# Patient Record
Sex: MALE | Race: WHITE | Employment: UNEMPLOYED | URBAN - METROPOLITAN AREA
[De-identification: names, ages, dates, MRNs, and addresses within clinical notes are randomized per-mention and may not be internally consistent; named-entity substitution may affect disease eponyms.]

---

## 2017-01-03 ENCOUNTER — HOSPITAL ENCOUNTER (INPATIENT)
Age: 62
LOS: 2 days | Discharge: HOME | DRG: 247 | End: 2017-01-05
Attending: FAMILY MEDICINE | Admitting: INTERNAL MEDICINE
Payer: COMMERCIAL

## 2017-01-03 PROBLEM — I24.9 ACS (ACUTE CORONARY SYNDROME) (HCC): Status: ACTIVE | Noted: 2017-01-03

## 2017-01-03 LAB
ALBUMIN SERPL BCP-MCNC: 3.6 G/DL (ref 3.4–5)
ALBUMIN/GLOB SERPL: 1.4 {RATIO} (ref 0.8–1.7)
ALP SERPL-CCNC: 61 U/L (ref 45–117)
ALT SERPL-CCNC: 31 U/L (ref 16–61)
ANION GAP BLD CALC-SCNC: 9 MMOL/L (ref 3–18)
APTT PPP: 36.9 SEC (ref 23–36.4)
AST SERPL W P-5'-P-CCNC: 37 U/L (ref 15–37)
BASOPHILS # BLD AUTO: 0 K/UL (ref 0–0.06)
BASOPHILS # BLD: 0 % (ref 0–2)
BILIRUB DIRECT SERPL-MCNC: 0.2 MG/DL (ref 0–0.2)
BILIRUB SERPL-MCNC: 0.8 MG/DL (ref 0.2–1)
BUN SERPL-MCNC: 17 MG/DL (ref 7–18)
BUN/CREAT SERPL: 21 (ref 12–20)
CALCIUM SERPL-MCNC: 8.1 MG/DL (ref 8.5–10.1)
CHLORIDE SERPL-SCNC: 104 MMOL/L (ref 100–108)
CK MB CFR SERPL CALC: 7.1 % (ref 0–4)
CK MB SERPL-MCNC: 10.5 NG/ML (ref 0.5–3.6)
CK SERPL-CCNC: 147 U/L (ref 39–308)
CO2 SERPL-SCNC: 25 MMOL/L (ref 21–32)
CREAT SERPL-MCNC: 0.8 MG/DL (ref 0.6–1.3)
DIFFERENTIAL METHOD BLD: ABNORMAL
EOSINOPHIL # BLD: 0 K/UL (ref 0–0.4)
EOSINOPHIL NFR BLD: 0 % (ref 0–5)
ERYTHROCYTE [DISTWIDTH] IN BLOOD BY AUTOMATED COUNT: 13.4 % (ref 11.6–14.5)
GLOBULIN SER CALC-MCNC: 2.5 G/DL (ref 2–4)
GLUCOSE SERPL-MCNC: 111 MG/DL (ref 74–99)
HCT VFR BLD AUTO: 37.9 % (ref 36–48)
HGB BLD-MCNC: 12.9 G/DL (ref 13–16)
INR PPP: 1.1 (ref 0.8–1.2)
LYMPHOCYTES # BLD AUTO: 17 % (ref 21–52)
LYMPHOCYTES # BLD: 2 K/UL (ref 0.9–3.6)
MCH RBC QN AUTO: 31.1 PG (ref 24–34)
MCHC RBC AUTO-ENTMCNC: 34 G/DL (ref 31–37)
MCV RBC AUTO: 91.3 FL (ref 74–97)
MONOCYTES # BLD: 1 K/UL (ref 0.05–1.2)
MONOCYTES NFR BLD AUTO: 8 % (ref 3–10)
NEUTS SEG # BLD: 8.8 K/UL (ref 1.8–8)
NEUTS SEG NFR BLD AUTO: 75 % (ref 40–73)
PLATELET # BLD AUTO: 126 K/UL (ref 135–420)
PMV BLD AUTO: 10.8 FL (ref 9.2–11.8)
POTASSIUM SERPL-SCNC: 3.4 MMOL/L (ref 3.5–5.5)
PROT SERPL-MCNC: 6.1 G/DL (ref 6.4–8.2)
PROTHROMBIN TIME: 13.7 SEC (ref 11.5–15.2)
RBC # BLD AUTO: 4.15 M/UL (ref 4.7–5.5)
SODIUM SERPL-SCNC: 138 MMOL/L (ref 136–145)
TROPONIN I SERPL-MCNC: 5.2 NG/ML (ref 0–0.04)
TSH SERPL DL<=0.05 MIU/L-ACNC: 2.66 UIU/ML (ref 0.36–3.74)
WBC # BLD AUTO: 11.8 K/UL (ref 4.6–13.2)

## 2017-01-03 PROCEDURE — 74011250637 HC RX REV CODE- 250/637: Performed by: INTERNAL MEDICINE

## 2017-01-03 PROCEDURE — 85610 PROTHROMBIN TIME: CPT | Performed by: INTERNAL MEDICINE

## 2017-01-03 PROCEDURE — 80048 BASIC METABOLIC PNL TOTAL CA: CPT | Performed by: INTERNAL MEDICINE

## 2017-01-03 PROCEDURE — 74011000250 HC RX REV CODE- 250: Performed by: INTERNAL MEDICINE

## 2017-01-03 PROCEDURE — 84443 ASSAY THYROID STIM HORMONE: CPT | Performed by: INTERNAL MEDICINE

## 2017-01-03 PROCEDURE — 85025 COMPLETE CBC W/AUTO DIFF WBC: CPT | Performed by: INTERNAL MEDICINE

## 2017-01-03 PROCEDURE — 74011250636 HC RX REV CODE- 250/636: Performed by: INTERNAL MEDICINE

## 2017-01-03 PROCEDURE — 93005 ELECTROCARDIOGRAM TRACING: CPT

## 2017-01-03 PROCEDURE — 85730 THROMBOPLASTIN TIME PARTIAL: CPT | Performed by: INTERNAL MEDICINE

## 2017-01-03 PROCEDURE — 65620000000 HC RM CCU GENERAL

## 2017-01-03 PROCEDURE — 36415 COLL VENOUS BLD VENIPUNCTURE: CPT | Performed by: INTERNAL MEDICINE

## 2017-01-03 PROCEDURE — 80076 HEPATIC FUNCTION PANEL: CPT | Performed by: INTERNAL MEDICINE

## 2017-01-03 PROCEDURE — 82550 ASSAY OF CK (CPK): CPT | Performed by: INTERNAL MEDICINE

## 2017-01-03 RX ORDER — ASPIRIN 325 MG
325 TABLET, DELAYED RELEASE (ENTERIC COATED) ORAL DAILY
Status: DISCONTINUED | OUTPATIENT
Start: 2017-01-04 | End: 2017-01-05

## 2017-01-03 RX ORDER — METOPROLOL TARTRATE 25 MG/1
12.5 TABLET, FILM COATED ORAL ONCE
Status: COMPLETED | OUTPATIENT
Start: 2017-01-03 | End: 2017-01-03

## 2017-01-03 RX ORDER — SODIUM CHLORIDE 0.9 % (FLUSH) 0.9 %
5-10 SYRINGE (ML) INJECTION EVERY 8 HOURS
Status: DISCONTINUED | OUTPATIENT
Start: 2017-01-03 | End: 2017-01-05 | Stop reason: HOSPADM

## 2017-01-03 RX ORDER — ATORVASTATIN CALCIUM 40 MG/1
40 TABLET, FILM COATED ORAL
Status: DISCONTINUED | OUTPATIENT
Start: 2017-01-03 | End: 2017-01-05 | Stop reason: HOSPADM

## 2017-01-03 RX ORDER — DOCUSATE SODIUM 100 MG/1
100 CAPSULE, LIQUID FILLED ORAL 2 TIMES DAILY
Status: DISCONTINUED | OUTPATIENT
Start: 2017-01-03 | End: 2017-01-05 | Stop reason: HOSPADM

## 2017-01-03 RX ORDER — ADHESIVE BANDAGE
30 BANDAGE TOPICAL DAILY PRN
Status: DISCONTINUED | OUTPATIENT
Start: 2017-01-03 | End: 2017-01-05 | Stop reason: HOSPADM

## 2017-01-03 RX ORDER — EPTIFIBATIDE 0.75 MG/ML
2 INJECTION, SOLUTION INTRAVENOUS CONTINUOUS
Status: DISCONTINUED | OUTPATIENT
Start: 2017-01-03 | End: 2017-01-04

## 2017-01-03 RX ORDER — GUAIFENESIN 100 MG/5ML
325 LIQUID (ML) ORAL
Status: DISPENSED | OUTPATIENT
Start: 2017-01-03 | End: 2017-01-04

## 2017-01-03 RX ORDER — METOPROLOL TARTRATE 25 MG/1
12.5 TABLET, FILM COATED ORAL 2 TIMES DAILY
Status: DISCONTINUED | OUTPATIENT
Start: 2017-01-03 | End: 2017-01-04

## 2017-01-03 RX ORDER — CLOPIDOGREL BISULFATE 75 MG/1
75 TABLET ORAL DAILY
Status: DISCONTINUED | OUTPATIENT
Start: 2017-01-04 | End: 2017-01-05 | Stop reason: HOSPADM

## 2017-01-03 RX ORDER — HEPARIN SODIUM 10000 [USP'U]/100ML
12-25 INJECTION, SOLUTION INTRAVENOUS
Status: DISCONTINUED | OUTPATIENT
Start: 2017-01-03 | End: 2017-01-04

## 2017-01-03 RX ORDER — NITROGLYCERIN 40 MG/100ML
5-20 INJECTION INTRAVENOUS
Status: DISCONTINUED | OUTPATIENT
Start: 2017-01-03 | End: 2017-01-05 | Stop reason: HOSPADM

## 2017-01-03 RX ORDER — SODIUM CHLORIDE 0.9 % (FLUSH) 0.9 %
5-10 SYRINGE (ML) INJECTION AS NEEDED
Status: DISCONTINUED | OUTPATIENT
Start: 2017-01-03 | End: 2017-01-05 | Stop reason: HOSPADM

## 2017-01-03 RX ADMIN — METOPROLOL TARTRATE 12.5 MG: 25 TABLET ORAL at 20:43

## 2017-01-03 RX ADMIN — METOPROLOL TARTRATE 12.5 MG: 25 TABLET ORAL at 22:26

## 2017-01-03 RX ADMIN — Medication 10 ML: at 20:49

## 2017-01-03 RX ADMIN — EPTIFIBATIDE 2 MCG/KG/MIN: 75 INJECTION INTRAVENOUS at 21:00

## 2017-01-03 RX ADMIN — ATORVASTATIN CALCIUM 40 MG: 40 TABLET, FILM COATED ORAL at 22:26

## 2017-01-03 RX ADMIN — DOCUSATE SODIUM 100 MG: 100 CAPSULE, LIQUID FILLED ORAL at 20:43

## 2017-01-03 RX ADMIN — NITROGLYCERIN 5 MCG/MIN: 40 INJECTION INTRAVENOUS at 22:38

## 2017-01-03 RX ADMIN — HEPARIN SODIUM AND DEXTROSE 19.23 UNITS/KG/HR: 10000; 5 INJECTION INTRAVENOUS at 21:00

## 2017-01-03 NOTE — ROUTINE PROCESS
TRANSFER - IN REPORT:    Verbal report received from 3 [place]hill RN(name[facility]  Katerina(unit) for urgent transfer      Report consisted of patients Situation, Background, Assessment and   Recommendations(SBAR). Information from the following report(s) SBAR, Kardex, ED Summary and MAR was reviewed with the receiving nurse. Opportunity for questions and clarification was provided. Assessment completed upon patients arrival to unit and care assumed.

## 2017-01-03 NOTE — IP AVS SNAPSHOT
[address] [address] 
[phone] Patient: Ирина Nelson MRN: [id number] KWY:[birth year] You are allergic to the following Allergen Reactions Crestor (Rosuvastatin) Unknown (comments) Intolerant; abdominal pain Iodine Hives IV dye Ivp Dye (Fd And C Blue No.1) Swelling Toradol (Ketorolac Tromethamine) Rash Other reaction(s): Hives Profuse sweating Tricor (Fenofibrate Micronized) Other (comments) Abdominal pain Zetia (Ezetimibe) Unknown (comments) Hot flashes Recent Documentation Height Weight BMI Smoking Status 1.803 m 105 kg 32.3 kg/m2 Former Smoker Emergency Contacts Name Discharge Info Relation Home Work Mobile Cyndi Coreas DECLINED CAREGIVER [4] Spouse [3] [phone] About your hospitalization You were admitted on:  January 3, 2017 You last received care in the:  [facility] 2 CV STEPDOWN You were discharged on:  January 5, 2017 Unit phone number:  [phone] Why you were hospitalized Your primary diagnosis was:  Not on File Your diagnoses also included:  Acs (Acute Coronary Syndrome) (Hcc) Providers Seen During Your Hospitalizations Provider Role Specialty Primary office phone Yusef Starkey MD Attending Provider [facility] [phone] Moises Najera MD Attending Provider Internal Medicine [phone] Your Primary Care Physician (PCP) Primary Care Physician Office Phone Office Fax Loganwil Chloe [phone] Follow-up Information Follow up With Details Comments Contact Info Gerardo Doshi MD On 1/10/2017 appointment time @2:30am please arrive 15 mintues early and bring in discharge information  [address] [place] [address] 
[phone] Brandon He NP Schedule an appointment as soon as possible for a visit in 3 weeks Will call patient with appointment date and time on Friday 1/6/16 [facility] [address] Cardiovascular [address] [facility] [address] 
[phone] Current Discharge Medication List  
  
START taking these medications Dose & Instructions Dispensing Information Comments Morning Noon Evening Bedtime  
 clopidogrel 75 mg Tab Commonly known as:  PLAVIX Your next dose is: Today, Tomorrow Other:  _________ Dose:  75 mg Take 1 Tab by mouth daily. Quantity:  30 Tab Refills:  1 CONTINUE these medications which have CHANGED Dose & Instructions Dispensing Information Comments Morning Noon Evening Bedtime * atorvastatin 40 mg tablet Commonly known as:  LIPITOR What changed:  Another medication with the same name was added. Make sure you understand how and when to take each. Your next dose is: Today, Tomorrow Other:  _________ TAKE 1 TABLET BY MOUTH DAILY Quantity:  90 tablet Refills:  3  
     
   
   
   
  
 * atorvastatin 40 mg tablet Commonly known as:  LIPITOR What changed: You were already taking a medication with the same name, and this prescription was added. Make sure you understand how and when to take each. Your next dose is: Today, Tomorrow Other:  _________ Dose:  40 mg Take 1 Tab by mouth nightly. Quantity:  30 Tab Refills:  1  
     
   
   
   
  
 metoprolol tartrate 25 mg tablet Commonly known as:  LOPRESSOR What changed:  See the new instructions. Your next dose is: Today, Tomorrow Other:  _________ Dose:  25 mg Take 1 Tab by mouth two (2) times a day. Quantity:  60 Tab Refills:  1  
     
   
   
   
  
 * Notice:   This list has 2 medication(s) that are the same as other medications prescribed for you. Read the directions carefully, and ask your doctor or other care provider to review them with you. CONTINUE these medications which have NOT CHANGED Dose & Instructions Dispensing Information Comments Morning Noon Evening Bedtime  
 aspirin delayed-release 81 mg tablet Your next dose is: Today, Tomorrow Other:  _________ Dose:  81 mg Take 81 mg by mouth. Refills:  0 BENEFIBER (GUAR GUM) PO Your next dose is: Today, Tomorrow Other:  _________ Take  by mouth. Refills:  0  
     
   
   
   
  
 cpap machine kit Your next dose is: Today, Tomorrow Other:  _________ Take  by inhalation nightly. Refills:  0 DULoxetine 60 mg capsule Commonly known as:  CYMBALTA Your next dose is: Today, Tomorrow Other:  _________ Dose:  60 mg Take 1 Cap by mouth daily. Quantity:  90 Cap Refills:  1  
     
   
   
   
  
 polyethylene glycol 17 gram packet Commonly known as:  Gypsy Mt Your next dose is: Today, Tomorrow Other:  _________ Dose:  17 g Take 17 g by mouth. Refills:  0  
     
   
   
   
  
 sildenafil 20 mg tablet Commonly known as:  REVATIO Your next dose is: Today, Tomorrow Other:  _________ Refills:  3  
     
   
   
   
  
 tadalafil 20 mg tablet Commonly known as:  CIALIS Your next dose is: Today, Tomorrow Other:  _________ Dose:  20 mg Take 20 mg by mouth as needed. Quantity:  15 Tab Refills:  0  
     
   
   
   
  
 tamsulosin 0.4 mg capsule Commonly known as:  FLOMAX Your next dose is: Today, Tomorrow Other:  _________ Refills:  0 STOP taking these medications   
 fenofibrate micronized 200 mg capsule Commonly known as:  LOFIBRA nystatin 100,000 unit/mL suspension Commonly known as:  MYCOSTATIN  
   
  
 omega-3 acid ethyl esters 1 gram capsule Commonly known as:  LOVAZA  
   
  
 oxyCODONE IR 5 mg immediate release tablet Commonly known as:  ROXICODONE  
   
  
 oxyCODONE-acetaminophen 5-325 mg per tablet Commonly known as:  PERCOCET  
   
  
 pravastatin 40 mg tablet Commonly known as:  PRAVACHOL  
   
  
 raNITIdine hcl 150 mg capsule STOOL SOFTENER PO  
   
  
 traMADol 50 mg tablet Commonly known as:  ULTRAM  
   
  
 VICODIN PO Where to Get Your Medications Information on where to get these meds will be given to you by the nurse or doctor. ! Ask your nurse or doctor about these medications  
  atorvastatin 40 mg tablet  
 clopidogrel 75 mg Tab  
 metoprolol tartrate 25 mg tablet Discharge Instructions None Discharge Orders None [employer] Announcement We are excited to announce that we are making your provider's discharge notes available to you in [employer]. You will see these notes when they are completed and signed by the physician that discharged you from your recent hospital stay. If you have any questions or concerns about any information you see in [employer], please call the Health Information Department where you were seen or reach out to your Primary Care Provider for more information about your plan of care. Introducing [facility]! Danuta Schmitt introduces [employer] patient portal. Now you can access parts of your medical record, email your doctor's office, and request medication refills online. 1. In your internet browser, go to [email or web address] 2. Click on the First Time User? Click Here link in the Sign In box. You will see the New Member Sign Up page. 3. Enter your [employer] Access Code exactly as it appears below. You will not need to use this code after youve completed the sign-up process.  If you do not sign up before the expiration date, you must request a new code. · [employer] Access Code: [email or web address] Expires: 3/19/2017  9:37 AM 
 
4. Enter the last four digits of your Social Security Number (xxxx) and Date of Birth (mm/dd/yyyy) as indicated and click Submit. You will be taken to the next sign-up page. 5. Create a [employer] ID. This will be your [employer] login ID and cannot be changed, so think of one that is secure and easy to remember. 6. Create a [employer] password. You can change your password at any time. 7. Enter your Password Reset Question and Answer. This can be used at a later time if you forget your password. 8. Enter your e-mail address. You will receive e-mail notification when new information is available in [address] 9. Click Sign Up. You can now view and download portions of your medical record. 10. Click the Download Summary menu link to download a portable copy of your medical information. If you have questions, please visit the Frequently Asked Questions section of the [employer] website. Remember, [employer] is NOT to be used for urgent needs. For medical emergencies, dial 911. Now available from your iPhone and Android! General Information Please provide this summary of care documentation to your next provider. Patient Signature:  ____________________________________________________________ Date:  ____________________________________________________________  
  
[facility] Provider Signature:  ____________________________________________________________ Date:  ____________________________________________________________

## 2017-01-04 ENCOUNTER — ANESTHESIA EVENT (OUTPATIENT)
Dept: CARDIAC CATH/INVASIVE PROCEDURES | Age: 62
DRG: 247 | End: 2017-01-04
Payer: COMMERCIAL

## 2017-01-04 ENCOUNTER — ANESTHESIA (OUTPATIENT)
Dept: CARDIAC CATH/INVASIVE PROCEDURES | Age: 62
DRG: 247 | End: 2017-01-04
Payer: COMMERCIAL

## 2017-01-04 ENCOUNTER — APPOINTMENT (OUTPATIENT)
Dept: CARDIAC CATH/INVASIVE PROCEDURES | Age: 62
DRG: 247 | End: 2017-01-04
Payer: COMMERCIAL

## 2017-01-04 LAB
ACT BLD: 358 SECS (ref 79–138)
ANION GAP BLD CALC-SCNC: 7 MMOL/L (ref 3–18)
ANION GAP BLD CALC-SCNC: 8 MMOL/L (ref 3–18)
APTT PPP: 134.2 SEC (ref 23–36.4)
APTT PPP: 29 SEC (ref 23–36.4)
APTT PPP: 35.2 SEC (ref 23–36.4)
APTT PPP: 80.2 SEC (ref 23–36.4)
ATRIAL RATE: 74 BPM
BUN SERPL-MCNC: 12 MG/DL (ref 7–18)
BUN SERPL-MCNC: 14 MG/DL (ref 7–18)
BUN/CREAT SERPL: 13 (ref 12–20)
BUN/CREAT SERPL: 19 (ref 12–20)
CALCIUM SERPL-MCNC: 8.5 MG/DL (ref 8.5–10.1)
CALCIUM SERPL-MCNC: 9 MG/DL (ref 8.5–10.1)
CALCULATED P AXIS, ECG09: 38 DEGREES
CALCULATED R AXIS, ECG10: 56 DEGREES
CALCULATED T AXIS, ECG11: 24 DEGREES
CHLORIDE SERPL-SCNC: 101 MMOL/L (ref 100–108)
CHLORIDE SERPL-SCNC: 103 MMOL/L (ref 100–108)
CHOLEST SERPL-MCNC: 169 MG/DL
CK MB CFR SERPL CALC: 5.3 % (ref 0–4)
CK MB CFR SERPL CALC: 6.2 % (ref 0–4)
CK MB SERPL-MCNC: 5.4 NG/ML (ref 0.5–3.6)
CK MB SERPL-MCNC: 7.1 NG/ML (ref 0.5–3.6)
CK SERPL-CCNC: 101 U/L (ref 39–308)
CK SERPL-CCNC: 114 U/L (ref 39–308)
CO2 SERPL-SCNC: 26 MMOL/L (ref 21–32)
CO2 SERPL-SCNC: 26 MMOL/L (ref 21–32)
CREAT SERPL-MCNC: 0.75 MG/DL (ref 0.6–1.3)
CREAT SERPL-MCNC: 0.91 MG/DL (ref 0.6–1.3)
DIAGNOSIS, 93000: NORMAL
GLUCOSE SERPL-MCNC: 118 MG/DL (ref 74–99)
GLUCOSE SERPL-MCNC: 165 MG/DL (ref 74–99)
HDLC SERPL-MCNC: 29 MG/DL (ref 40–60)
HDLC SERPL: 5.8 {RATIO} (ref 0–5)
INR PPP: 1.1 (ref 0.8–1.2)
LDLC SERPL CALC-MCNC: ABNORMAL MG/DL (ref 0–100)
LIPID PROFILE,FLP: ABNORMAL
P-R INTERVAL, ECG05: 170 MS
POTASSIUM SERPL-SCNC: 3.6 MMOL/L (ref 3.5–5.5)
POTASSIUM SERPL-SCNC: 3.6 MMOL/L (ref 3.5–5.5)
PROTHROMBIN TIME: 13.8 SEC (ref 11.5–15.2)
Q-T INTERVAL, ECG07: 386 MS
QRS DURATION, ECG06: 86 MS
QTC CALCULATION (BEZET), ECG08: 428 MS
SODIUM SERPL-SCNC: 134 MMOL/L (ref 136–145)
SODIUM SERPL-SCNC: 137 MMOL/L (ref 136–145)
TRIGL SERPL-MCNC: 404 MG/DL (ref ?–150)
TROPONIN I SERPL-MCNC: 1.74 NG/ML (ref 0–0.04)
TROPONIN I SERPL-MCNC: 2.63 NG/ML (ref 0–0.04)
TROPONIN I SERPL-MCNC: 3.54 NG/ML (ref 0–0.04)
VENTRICULAR RATE, ECG03: 74 BPM
VLDLC SERPL CALC-MCNC: ABNORMAL MG/DL

## 2017-01-04 PROCEDURE — 80048 BASIC METABOLIC PNL TOTAL CA: CPT | Performed by: INTERNAL MEDICINE

## 2017-01-04 PROCEDURE — 4A023N7 MEASUREMENT OF CARDIAC SAMPLING AND PRESSURE, LEFT HEART, PERCUTANEOUS APPROACH: ICD-10-PCS | Performed by: INTERNAL MEDICINE

## 2017-01-04 PROCEDURE — 74011000258 HC RX REV CODE- 258: Performed by: INTERNAL MEDICINE

## 2017-01-04 PROCEDURE — 74011250637 HC RX REV CODE- 250/637: Performed by: INTERNAL MEDICINE

## 2017-01-04 PROCEDURE — 74011636320 HC RX REV CODE- 636/320: Performed by: PHYSICIAN ASSISTANT

## 2017-01-04 PROCEDURE — 80061 LIPID PANEL: CPT | Performed by: INTERNAL MEDICINE

## 2017-01-04 PROCEDURE — 85730 THROMBOPLASTIN TIME PARTIAL: CPT | Performed by: INTERNAL MEDICINE

## 2017-01-04 PROCEDURE — 77010033678 HC OXYGEN DAILY

## 2017-01-04 PROCEDURE — 74011250636 HC RX REV CODE- 250/636: Performed by: INTERNAL MEDICINE

## 2017-01-04 PROCEDURE — 77030013797 CARDIAC CATHETERIZATION

## 2017-01-04 PROCEDURE — B2111ZZ FLUOROSCOPY OF MULTIPLE CORONARY ARTERIES USING LOW OSMOLAR CONTRAST: ICD-10-PCS | Performed by: INTERNAL MEDICINE

## 2017-01-04 PROCEDURE — 93306 TTE W/DOPPLER COMPLETE: CPT

## 2017-01-04 PROCEDURE — 74011000250 HC RX REV CODE- 250: Performed by: PHYSICIAN ASSISTANT

## 2017-01-04 PROCEDURE — 36415 COLL VENOUS BLD VENIPUNCTURE: CPT | Performed by: INTERNAL MEDICINE

## 2017-01-04 PROCEDURE — B246ZZZ ULTRASONOGRAPHY OF RIGHT AND LEFT HEART: ICD-10-PCS | Performed by: INTERNAL MEDICINE

## 2017-01-04 PROCEDURE — 76060000033 HC ANESTHESIA 1 TO 1.5 HR

## 2017-01-04 PROCEDURE — 85610 PROTHROMBIN TIME: CPT | Performed by: INTERNAL MEDICINE

## 2017-01-04 PROCEDURE — 65620000000 HC RM CCU GENERAL

## 2017-01-04 PROCEDURE — 027034Z DILATION OF CORONARY ARTERY, ONE ARTERY WITH DRUG-ELUTING INTRALUMINAL DEVICE, PERCUTANEOUS APPROACH: ICD-10-PCS | Performed by: INTERNAL MEDICINE

## 2017-01-04 PROCEDURE — 74011250636 HC RX REV CODE- 250/636: Performed by: PHYSICIAN ASSISTANT

## 2017-01-04 PROCEDURE — 93005 ELECTROCARDIOGRAM TRACING: CPT

## 2017-01-04 PROCEDURE — 85347 COAGULATION TIME ACTIVATED: CPT

## 2017-01-04 PROCEDURE — B2151ZZ FLUOROSCOPY OF LEFT HEART USING LOW OSMOLAR CONTRAST: ICD-10-PCS | Performed by: INTERNAL MEDICINE

## 2017-01-04 PROCEDURE — 84484 ASSAY OF TROPONIN QUANT: CPT | Performed by: INTERNAL MEDICINE

## 2017-01-04 PROCEDURE — 74011250636 HC RX REV CODE- 250/636

## 2017-01-04 RX ORDER — CLOPIDOGREL 300 MG/1
300 TABLET, FILM COATED ORAL ONCE
Status: COMPLETED | OUTPATIENT
Start: 2017-01-04 | End: 2017-01-04

## 2017-01-04 RX ORDER — HEPARIN SODIUM 1000 [USP'U]/ML
1000-10000 INJECTION, SOLUTION INTRAVENOUS; SUBCUTANEOUS
Status: DISCONTINUED | OUTPATIENT
Start: 2017-01-04 | End: 2017-01-04 | Stop reason: HOSPADM

## 2017-01-04 RX ORDER — ASPIRIN 81 MG/1
81 TABLET ORAL DAILY
Status: DISCONTINUED | OUTPATIENT
Start: 2017-01-05 | End: 2017-01-05 | Stop reason: HOSPADM

## 2017-01-04 RX ORDER — MAG HYDROX/ALUMINUM HYD/SIMETH 200-200-20
30 SUSPENSION, ORAL (FINAL DOSE FORM) ORAL
Status: DISCONTINUED | OUTPATIENT
Start: 2017-01-04 | End: 2017-01-05 | Stop reason: HOSPADM

## 2017-01-04 RX ORDER — HEPARIN SODIUM 200 [USP'U]/100ML
500 INJECTION, SOLUTION INTRAVENOUS ONCE
Status: COMPLETED | OUTPATIENT
Start: 2017-01-04 | End: 2017-01-04

## 2017-01-04 RX ORDER — NITROGLYCERIN 400 UG/1
1 SPRAY ORAL
Status: ACTIVE | OUTPATIENT
Start: 2017-01-04 | End: 2017-01-05

## 2017-01-04 RX ORDER — DIPHENHYDRAMINE HYDROCHLORIDE 50 MG/ML
25 INJECTION, SOLUTION INTRAMUSCULAR; INTRAVENOUS ONCE
Status: COMPLETED | OUTPATIENT
Start: 2017-01-04 | End: 2017-01-04

## 2017-01-04 RX ORDER — MIDAZOLAM HYDROCHLORIDE 1 MG/ML
INJECTION, SOLUTION INTRAMUSCULAR; INTRAVENOUS AS NEEDED
Status: DISCONTINUED | OUTPATIENT
Start: 2017-01-04 | End: 2017-01-04 | Stop reason: HOSPADM

## 2017-01-04 RX ORDER — FENTANYL CITRATE 50 UG/ML
12.5-1 INJECTION, SOLUTION INTRAMUSCULAR; INTRAVENOUS
Status: DISCONTINUED | OUTPATIENT
Start: 2017-01-04 | End: 2017-01-04 | Stop reason: HOSPADM

## 2017-01-04 RX ORDER — METOPROLOL TARTRATE 25 MG/1
25 TABLET, FILM COATED ORAL 2 TIMES DAILY
Status: DISCONTINUED | OUTPATIENT
Start: 2017-01-04 | End: 2017-01-05 | Stop reason: HOSPADM

## 2017-01-04 RX ORDER — IODIXANOL 320 MG/ML
100 INJECTION, SOLUTION INTRAVASCULAR
Status: DISPENSED | OUTPATIENT
Start: 2017-01-04 | End: 2017-01-04

## 2017-01-04 RX ORDER — BIVALIRUDIN 250 MG/5ML
0.75 INJECTION, POWDER, LYOPHILIZED, FOR SOLUTION INTRAVENOUS ONCE
Status: DISCONTINUED | OUTPATIENT
Start: 2017-01-04 | End: 2017-01-04 | Stop reason: HOSPADM

## 2017-01-04 RX ORDER — SODIUM CHLORIDE 450 MG/100ML
150 INJECTION, SOLUTION INTRAVENOUS ONCE
Status: COMPLETED | OUTPATIENT
Start: 2017-01-04 | End: 2017-01-04

## 2017-01-04 RX ORDER — SODIUM CHLORIDE 0.9 % (FLUSH) 0.9 %
5-10 SYRINGE (ML) INJECTION EVERY 8 HOURS
Status: DISCONTINUED | OUTPATIENT
Start: 2017-01-04 | End: 2017-01-05 | Stop reason: HOSPADM

## 2017-01-04 RX ORDER — LORAZEPAM 2 MG/ML
1 INJECTION INTRAMUSCULAR
Status: DISCONTINUED | OUTPATIENT
Start: 2017-01-04 | End: 2017-01-05 | Stop reason: HOSPADM

## 2017-01-04 RX ORDER — MIDAZOLAM HYDROCHLORIDE 1 MG/ML
.5-2 INJECTION, SOLUTION INTRAMUSCULAR; INTRAVENOUS
Status: DISCONTINUED | OUTPATIENT
Start: 2017-01-04 | End: 2017-01-04 | Stop reason: HOSPADM

## 2017-01-04 RX ORDER — SODIUM CHLORIDE 0.9 % (FLUSH) 0.9 %
5-10 SYRINGE (ML) INJECTION AS NEEDED
Status: DISCONTINUED | OUTPATIENT
Start: 2017-01-04 | End: 2017-01-05 | Stop reason: HOSPADM

## 2017-01-04 RX ORDER — VERAPAMIL HYDROCHLORIDE 2.5 MG/ML
5 INJECTION, SOLUTION INTRAVENOUS ONCE
Status: DISCONTINUED | OUTPATIENT
Start: 2017-01-04 | End: 2017-01-04 | Stop reason: HOSPADM

## 2017-01-04 RX ORDER — LIDOCAINE HYDROCHLORIDE 10 MG/ML
1-30 INJECTION, SOLUTION EPIDURAL; INFILTRATION; INTRACAUDAL; PERINEURAL
Status: DISCONTINUED | OUTPATIENT
Start: 2017-01-04 | End: 2017-01-04 | Stop reason: HOSPADM

## 2017-01-04 RX ADMIN — FENTANYL CITRATE 50 MCG: 50 INJECTION INTRAMUSCULAR; INTRAVENOUS at 10:07

## 2017-01-04 RX ADMIN — Medication 10 ML: at 21:40

## 2017-01-04 RX ADMIN — EPTIFIBATIDE 2 MCG/KG/MIN: 75 INJECTION INTRAVENOUS at 10:23

## 2017-01-04 RX ADMIN — EPTIFIBATIDE 2 MCG/KG/MIN: 75 INJECTION INTRAVENOUS at 04:10

## 2017-01-04 RX ADMIN — SODIUM CHLORIDE 150 ML: 450 INJECTION, SOLUTION INTRAVENOUS at 15:27

## 2017-01-04 RX ADMIN — METOPROLOL TARTRATE 25 MG: 25 TABLET ORAL at 17:43

## 2017-01-04 RX ADMIN — Medication 10 ML: at 13:10

## 2017-01-04 RX ADMIN — DIPHENHYDRAMINE HYDROCHLORIDE 25 MG: 50 INJECTION INTRAMUSCULAR; INTRAVENOUS at 09:23

## 2017-01-04 RX ADMIN — LIDOCAINE HYDROCHLORIDE 22 ML: 10 INJECTION, SOLUTION EPIDURAL; INFILTRATION; INTRACAUDAL; PERINEURAL at 10:37

## 2017-01-04 RX ADMIN — CLOPIDOGREL BISULFATE 300 MG: 300 TABLET, FILM COATED ORAL at 12:06

## 2017-01-04 RX ADMIN — HEPARIN SODIUM 1000 UNITS: 200 INJECTION, SOLUTION INTRAVENOUS at 10:10

## 2017-01-04 RX ADMIN — IOPAMIDOL 200 ML: 612 INJECTION, SOLUTION INTRAVENOUS at 12:06

## 2017-01-04 RX ADMIN — MIDAZOLAM HYDROCHLORIDE 2 MG: 1 INJECTION, SOLUTION INTRAMUSCULAR; INTRAVENOUS at 12:11

## 2017-01-04 RX ADMIN — MIDAZOLAM HYDROCHLORIDE 1 MG: 1 INJECTION, SOLUTION INTRAMUSCULAR; INTRAVENOUS at 10:08

## 2017-01-04 RX ADMIN — MIDAZOLAM HYDROCHLORIDE 1 MG: 1 INJECTION, SOLUTION INTRAMUSCULAR; INTRAVENOUS at 10:37

## 2017-01-04 RX ADMIN — METHYLPREDNISOLONE SODIUM SUCCINATE 125 MG: 125 INJECTION, POWDER, FOR SOLUTION INTRAMUSCULAR; INTRAVENOUS at 09:23

## 2017-01-04 RX ADMIN — MIDAZOLAM HYDROCHLORIDE 1 MG: 1 INJECTION, SOLUTION INTRAMUSCULAR; INTRAVENOUS at 10:54

## 2017-01-04 RX ADMIN — ASPIRIN 325 MG: 325 TABLET, COATED ORAL at 09:10

## 2017-01-04 RX ADMIN — MAGNESIUM HYDROXIDE 30 ML: 400 SUSPENSION ORAL at 13:09

## 2017-01-04 RX ADMIN — METOPROLOL TARTRATE 12.5 MG: 25 TABLET ORAL at 09:10

## 2017-01-04 RX ADMIN — LIDOCAINE HYDROCHLORIDE 6 ML: 10 INJECTION, SOLUTION EPIDURAL; INFILTRATION; INTRACAUDAL; PERINEURAL at 10:30

## 2017-01-04 RX ADMIN — MIDAZOLAM HYDROCHLORIDE 1 MG: 1 INJECTION, SOLUTION INTRAMUSCULAR; INTRAVENOUS at 10:16

## 2017-01-04 RX ADMIN — NITROGLYCERIN 100 MCG: 5 INJECTION, SOLUTION INTRAVENOUS at 11:46

## 2017-01-04 RX ADMIN — DOCUSATE SODIUM 100 MG: 100 CAPSULE, LIQUID FILLED ORAL at 17:43

## 2017-01-04 RX ADMIN — ATORVASTATIN CALCIUM 40 MG: 40 TABLET, FILM COATED ORAL at 21:41

## 2017-01-04 RX ADMIN — DOCUSATE SODIUM 100 MG: 100 CAPSULE, LIQUID FILLED ORAL at 09:10

## 2017-01-04 RX ADMIN — HEPARIN SODIUM 5000 UNITS: 1000 INJECTION, SOLUTION INTRAVENOUS; SUBCUTANEOUS at 11:34

## 2017-01-04 NOTE — ANESTHESIA PREPROCEDURE EVALUATION
Anesthetic History               Review of Systems / Medical History  Patient summary reviewed and pertinent labs reviewed    Pulmonary        Sleep apnea: CPAP           Neuro/Psych   Within defined limits           Cardiovascular    Hypertension          CAD         GI/Hepatic/Renal                Endo/Other        Morbid obesity and arthritis     Other Findings              Physical Exam    Airway  Mallampati: II  TM Distance: 4 - 6 cm  Neck ROM: normal range of motion   Mouth opening: Normal     Cardiovascular  Regular rate and rhythm,  S1 and S2 normal,  no murmur, click, rub, or gallop             Dental         Pulmonary  Breath sounds clear to auscultation               Abdominal  GI exam deferred       Other Findings            Anesthetic Plan    ASA: 3  Anesthesia type: MAC            Anesthetic plan and risks discussed with: Spouse      History obtained from chart. Called to sedate patient who was on cath lab table and unable to sedate adequately. Telephone consent obtained from patient\"s wife. Patient had received 4mg Versed and 50ug Fentanyl IV.

## 2017-01-04 NOTE — PROGRESS NOTES
Pt climbed out of bed and ambulated to commode. Informed pt that bedrest was not up until 1630. Pt reports \" you didn't want me to shit on myself\". Encourage pt to use call bell. Pt verbalized understanding.  No hematoma or  bleeding at site;distal pulses  present

## 2017-01-04 NOTE — PROGRESS NOTES
Care Management Interventions  PCP Verified by CM: Yes  Last Visit to PCP: 12/07/16  Palliative Care Consult (Criteria: CHF and RRAT>21): No  Reason for No Palliative Care Consult: Other (see comment) (RRAT=10)  Transition of Care Consult (CM Consult): Other  Discharge Durable Medical Equipment:  (Cane and C-PAP machine at home; stated that he needs a mask.)  Physical Therapy Consult: No  Occupational Therapy Consult: No  Speech Therapy Consult: No  Current Support Network: Lives with Spouse  Confirm Follow Up Transport: Family  Plan discussed with Pt/Family/Caregiver: Yes     Patient is alert and oriented x 4; pleasant upon approach and consented to d/c plan interview; stated that he resides with his wife and plans to return home upon discharge; stated also that he is self-care with adls/iadls; dme is listed above; family will provide transportation from the hospital; will follow and assist as needed.

## 2017-01-04 NOTE — ROUTINE PROCESS
Patient to CVT CCU per bed, awake, alert and oriented, Integrilllin gtt, heparin gtt, and Nitro gtt infusing, oxygen on at 2 lpm via nasal cannula accompanied by wife.

## 2017-01-04 NOTE — PROGRESS NOTES
TRANSFER - IN REPORT:    Verbal report received from [address] [address] RN(name) on [employer]  being received from CCL(unit) for ordered procedure      Report consisted of patients Situation, Background, Assessment and   Recommendations(SBAR). Information from the following report(s) SBAR, Intake/Output and MAR was reviewed with the receiving nurse. Opportunity for questions and clarification was provided. Assessment completed upon patients arrival to unit and care assumed.

## 2017-01-04 NOTE — PROGRESS NOTES
1/3/16, 2152 Patient arrived in unit via stretcher, from [facility] with RN accompanying. Heparin, Nitro and Integrelin gtt running. Patient complained of headache, intermittent nausea. , no chest pain. Skin intact. Spouse present. Report received from Vijay Dye, [facility].

## 2017-01-04 NOTE — PROGRESS NOTES
Informed Jackeline ANDERSON of pt c/o heartburn that goes from chest to throat.  New orders  recieved

## 2017-01-04 NOTE — CONSULTS
[facility] - Consult Note    Consultation request by Frank Rucker MD for advice/opinion related to evaluating Unstable angina  ACS (acute coronary syndrome) [facility])    Date of  Admission: 1/3/2017  7:59 PM   Primary Care Physician:  Sonya Dahl MD     Assessment:     Patient Active Problem List   Diagnosis Code    Morbidly obese ([facility] [address] E66.01    Hernia of unspecified site of abdominal cavity without mention of obstruction or gangrene K46.9    Coronary atherosclerosis of native coronary artery with know total RCA in cath 1994. I25.10    IGNACIO (obstructive sleep apnea) sleeping with CPAP G47.33    AAA (abdominal aortic aneurysm), small infrarenal I71.4    Osteoarthritis M19.90    Dyslipidemia E78.5    Sciatica M54.30    History of hepatomegaly, thrombocytopenia, and leukopenia, etiology unclear R16.0    Dizziness R42    Lumbar postlaminectomy syndrome M96.1    Abdominal distension R14.0    Familial multiple polyposis syndrome D12.6    Special screening for malignant neoplasms, colon Z12.11    Epigastric pain R10.13    Hyperlipidemia E78.5    Nausea and vomiting R11.2    Postoperative state Z98.890    History of surgical removal of intestinal structure Z90.49    Infection of skin L08.9    Small bowel obstruction (HCC) K56.69    Routine general medical examination at a health care facility Z00.00    Candidiasis of skin B37.2    Displacement of lumbar intervertebral disc without myelopathy M51.26    Spinal stenosis, lumbar region, without neurogenic claudication M48.06    Postlaminectomy syndrome, lumbar region M96.1    Other intervertebral disc displacement, lumbar region M51.26    Spinal stenosis, lumbar M48.06    Postlaminectomy syndrome M96.1    ACS (acute coronary syndrome) (HCC) I24.9       -NSTEMI with peak troponin 5, ECG without ischemic changes.  Now CP free after being started on heparin, integrelin, nitro drip at [facility].  -CAD with totally obstructed right coronary artery and widely patent left coronary arteries with some left-to-right collaterals and normal left ventricular function in cardiac catheterization back in 1994. No ischemia with normal LV function on nuclear 2013. Lost to follow up since 2014.  -Dyslipidemia, no longer on statin. -IGNACIO no longer uses CPAP. -Asbestosis. -PTSD and anxiety.  -Obesity with recent intentional weight loss. -Contrast allergy, hives.  -History of hepatomegaly, thrombocytopenia, and leukopenia, now s/ppartial colectomy and cholecystectomy last year. Plan:     Have discussed cardiac catheterization including risks, benefits, alternatives. He understands and wishes to proceed. Continued on heparin, intergrilin, nitroglycerin. Will premedicate with solumedrol and benedryl for iodine/contrast allergy. He is concerned about his PTSD being aggravated by stress of this hospitalization, will follow closely. Continued on ASA and plavix. Continued on BB. Will check echocardiogram.     History of Present Illness: This is a 64 y.o. male admitted for Unstable angina  ACS (acute coronary syndrome) ([facility] [address] Patient complains of:  CP. Patient is a 64year old male with history of CAD as noted above who presented to [facility] with Cp. Patient reports over the previous few days he has had intermittent chest discomfort with associated N/V and diaphoresis. He attributed it to his asbestosis aggravated by a candle. Patient reports yesterday while outside however he had a severe episode which was so intense that it brought him to the ground. He was brought to [facility]. He had no ecg changes but troponin was trending upward. He had continued pain. He was started on nitro, heparin and eventually nitro drip. He was eventually pain free and transported to [facility] for further evaluation.       Cardiac risk factors:   CAD  Lipids  IGNACIO      Review of Symptoms:   Constitutional: negative for fevers and chills  Eyes: negative for visual disturbance  Ears, nose, mouth, throat, and face: negative for nasal congestion  Respiratory: negative for cough  Cardiovascular: positive for chest pain, negative for orthopnea, lower extremity edema  Gastrointestinal: negative for diarrhea  Genitourinary:negative for dysuria  Hematologic/lymphatic: negative for bleeding  Musculoskeletal:negative for muscle weakness  Neurological: negative for dizziness     Past Medical History:     Past Medical History   Diagnosis Date    Aortic aneurysm, abdominal (HCC)     Back pain     Diverticulitis     Hearing loss     Heart disease      ischemic    Hernia of unspecified site of abdominal cavity without mention of obstruction or gangrene     History of myocardial perfusion scan 08/30/2013     Med-sized, fixed inferior defect most likely artifact rather than prior infarction. No ischemia. No WMA. EF 55%.  Holter monitor, normal 09/09/2013     Normal Holter w/5 PVCs. No symptoms reported.  Hypercholesterolemia     Hyperlipidemia     Hypertension     Hypertriglyceridemia     Joint pain     Low back pain radiating to both legs     Lumbar postlaminectomy syndrome     Lung disease     Morbidly obese (HCC)     Osteoarthritis     S/P cardiac cath 03/09/1994     pRCA 100%. Otherwise patent coronaries. LVEDP 20-25. EF 70%.       Sciatica     Sleep apnea     [place] injury due to shrapnel      [place]    Wears glasses          Social History:     Social History     Social History    Marital status:      Spouse name: N/A    Number of children: N/A    Years of education: N/A     Social History Main Topics    Smoking status: Former Smoker     Quit date: 1/1/2001    Smokeless tobacco: Never Used    Alcohol use Yes      Comment: occasional    Drug use: Not on file    Sexual activity: Not on file     Other Topics Concern    Not on file     Social History Narrative        Family History:     Family History   Problem Relation Age of Onset    Heart Surgery Father      triple bypass with stent placement    High Cholesterol Father         Medications:      Allergies   Allergen Reactions    Crestor [Rosuvastatin] Unknown (comments)     Intolerant; abdominal pain    Iodine Hives     IV dye    Ivp Dye [Fd And C Blue No.1] Swelling    Toradol [Ketorolac Tromethamine] Rash     Other reaction(s): Hives  Profuse sweating    Tricor [Fenofibrate Micronized] Other (comments)     Abdominal pain    Zetia [Ezetimibe] Unknown (comments)     Hot flashes        Current Facility-Administered Medications   Medication Dose Route Frequency    aspirin delayed-release tablet 325 mg  325 mg Oral DAILY    sodium chloride (NS) flush 5-10 mL  5-10 mL IntraVENous Q8H    sodium chloride (NS) flush 5-10 mL  5-10 mL IntraVENous PRN    magnesium hydroxide (MILK OF MAGNESIA) 400 mg/5 mL oral suspension 30 mL  30 mL Oral DAILY PRN    docusate sodium (COLACE) capsule 100 mg  100 mg Oral BID    metoprolol tartrate (LOPRESSOR) tablet 12.5 mg  12.5 mg Oral BID    aspirin chewable tablet 324 mg  324 mg Oral NOW    atorvastatin (LIPITOR) tablet 40 mg  40 mg Oral QHS    heparin 25,000 units in D5W 250 ml infusion  12-25 Units/kg/hr IntraVENous TITRATE    eptifibatide (INTEGRILIN) 0.75 mg/mL infusion  2 mcg/kg/min IntraVENous CONTINUOUS    nitroglycerin (TRIDIL) 400 mcg/ml infusion  5-20 mcg/min IntraVENous TITRATE    clopidogrel (PLAVIX) tablet 75 mg  75 mg Oral DAILY         Physical Exam:     Visit Vitals    /69    Pulse 78    Temp 98.4 °F (36.9 °C)    Resp 18    Ht 5' 10.98\" (1.803 m)    Wt 104 kg (229 lb 4.8 oz)    SpO2 100%    BMI 32 kg/m2     BP Readings from Last 3 Encounters:   01/04/17 115/69   12/19/16 140/83   07/29/16 126/77     Pulse Readings from Last 3 Encounters:   01/04/17 78   12/19/16 66   07/29/16 71     Wt Readings from Last 3 Encounters:   01/04/17 104 kg (229 lb 4.8 oz)   12/19/16 107.5 kg (237 lb)   07/29/16 103.8 kg (228 lb 12.8 oz) General:  alert, cooperative, no distress, appears stated age  Neck:  no JVD  Lungs:  clear to auscultation bilaterally  Heart:  regular rate and rhythm, S1, S2 normal, no murmur, click, rub or gallop  Abdomen:  abdomen is soft without significant tenderness, masses, organomegaly or guarding  Extremities:  extremities normal, atraumatic, no cyanosis or edema  Skin: Warm and dry. no hyperpigmentation, vitiligo, or suspicious lesions  Neuro: alert, oriented x3, affect appropriate  Psych: non focal     Data Review:     Recent Labs      01/03/17   2115   WBC  11.8   HGB  12.9*   HCT  37.9   PLT  126*     Recent Labs      01/04/17   0340  01/03/17   2115   NA  137  138   K  3.6  3.4*   CL  103  104   CO2  26  25   GLU  118*  111*   BUN  14  17   CREA  0.75  0.80   CA  8.5  8.1*   ALB   --   3.6   SGOT   --   37   ALT   --   31   INR  1.1  1.1       Results for orders placed or performed in visit on 10/23/14   AMB POC EKG ROUTINE W/ 12 LEADS, INTER & REP    Narrative    Normal sinus rhythm with rate 74. This EKG is within normal  limits and similar to the EKG of February 17, 2014. Results for orders placed or performed in visit on 09/09/13   HOLTER MONITOR, 24 HOURS    Impression    See scanned report.        All Cardiac Markers in the last 24 hours:    Lab Results   Component Value Date/Time     01/04/2017 03:40 AM     01/03/2017 09:15 PM    CKMB 7.1 (H) 01/04/2017 03:40 AM    CKMB 10.5 (H) 01/03/2017 09:15 PM    CKND1 6.2 (H) 01/04/2017 03:40 AM    CKND1 7.1 (H) 01/03/2017 09:15 PM    TROIQ 3.54 ([facility]) 01/04/2017 03:40 AM    TROIQ 5.20 ([facility]) 01/03/2017 09:15 PM       Last Lipid:    Lab Results   Component Value Date/Time    Cholesterol, total 169 01/04/2017 03:40 AM    HDL Cholesterol 29 01/04/2017 03:40 AM    LDL, calculated  01/04/2017 03:40 AM     LDL AND VLDL CHOLESTEROL NOT CALCULATED WHEN TRIGLYCERIDES >400 MG/DL OR HDL CHOLESTEROL <20 MG/DL    Triglyceride 404 01/04/2017 03:40 AM    CHOL/HDL Ratio 5.8 01/04/2017 03:40 AM       Signed By: MONICA Godoy     January 4, 2017

## 2017-01-04 NOTE — CARDIO/PULMONARY
Cardiac rehab in to see patient. He was lying in bed. Talked with patient about relaxation, exercise and nutrition. Patient stated that he rides his bike every morning while he watches the news. He stated that he is also building a house himself so he climbs ladders and puts up walls, etc. Encouraged him to participate in the outpatient cardiac rehab program to regain his strength and stamina. He would like to participate in the program closer to his home of [place], [place]. Phone number given to him. We then talked about his diet. He stated that his wife is a health nut. They eat very healthy. Encouraged them not to mckay his foods as they prepare them. He stated that he can only eat fried fish. Told him not to have it fried all the time. Advised to bake, grill, broil, steam and roast as he prepares his food choices. He stated that he and his wife will look at what they do and make changes where necessary. He appreciated the visit. Will follow throughout his hospital stay.

## 2017-01-04 NOTE — H&P
[address] [facility]  ROUTINE H AND PS    Name:  Jose M Hamilton  MR#:  [id number]  :  1955  Account #:  [de-identified]  Date of Adm:  2017      PRIMARY CARE PHYSICIAN: Cortney Nunez MD    CHIEF COMPLAINT: Syncope. HISTORY OF PRESENT ILLNESS: The patient is a 35-year-old   male with a history of ischemic heart disease, unknown  specifics; possibly right coronary artery disease noted in , per  cardiology notes; history of abdominal aortic aneurysm; dyslipidemia,  among other medical problems, [place] with reports of syncopal event. He reports that he has been  having episodes of chest pain and presyncope. He has had 2 episodes  of presyncope and chest pain within the last few days. The chest pain  is substernal and is pressure with associated symptoms of diaphoresis,  nausea, vomiting. The chest pain did radiate to bilateral upper  extremities he said. He was not medically evaluated for these 2  episodes mentioned above. This morning while he was in a truck  getting ready to go see his dentist, he had an episode where he says  he may have passed out briefly. Again, along with this he did get  substernal chest pressure. He was seen at this outside facility ER and  he was accepted to our facility, as his cardiologist is based here. Throughout the course of his stay while he was waiting for transfer to  our facility, he, per the ER physician phone conversation, was noted to  have elevation of his troponin as high as around 4, I believe. He was  also complaining of chest pain and was started on a nitroglycerin drip,  and Integrilin drip, in addition to the heparin drip. He did not have EKG  findings of acute coronary syndrome and he has been transferred to us  with a diagnosis of non-ST elevation myocardial infarction.  At the time  of the transfer here, he was on a nitroglycerin drip, he was also on an  Integrilin drip and a heparin drip. He reports as having chest pain 1/10  and complained more of a headache since being on the nitroglycerin  drip. REVIEW OF SYSTEMS: Please see above, otherwise 10-point review  of systems checked and negative. PAST MEDICAL HISTORY  1. Coronary artery disease. Per Cardiology notes, he is noted to have  ischemic heart disease and was noted at some point to have single-  vessel coronary artery disease with a totally obstructed right coronary  artery and this was in 1994.  2. Dyslipidemia. 3. Hypertension. 4. Obstructive sleep apnea. He reports his CPAP machine broke and  he has not used it for about a year. 5. History of abdominal aortic aneurysm. He states Dr. Vicky Lacey has  been following it. SURGICAL HISTORY: Includes cholecystectomy; some type of  intestinal surgery, unknown specifics; surgery related to trauma to his  back/sacrum, his arm. ALLERGIES  1. TORADOL. 2. IV DYE. 3. CRESTOR. 4. TRICOR. [facility]. SOCIAL HISTORY: He stopped smoking cigarettes 16 years ago. He  drinks \"very little\" he says. He denies recreational drugs. He is retired  from [employer] and is . FAMILY HISTORY: Father, heart surgery and high cholesterol,  unknown specifics. MEDICATIONS  According to the paperwork that came with him, he is on:  1. Aspirin 81 mg daily. 2. Cymbalta 60 mg daily. 3. Metoprolol 50 mg at night. 4. Vitamin E.  5. MiraLax. 6. Cialis. PHYSICAL EXAMINATION  VITAL SIGNS: He is afebrile with stable vital signs. GENERAL: He is an obese male who appears stated age, resting  comfortably, in no acute distress. PSYCHIATRIC: He is alert, awake, oriented. He has appropriate affect. Recent and remote memory appear intact. HEENT: Head is atraumatic, normocephalic. Sclerae anicteric. Oropharynx without lesions. Moist mucous membranes. NECK: Supple. LYMPHATICS: No neck or axillary lymphadenopathy. CARDIOVASCULAR: Regular rate and rhythm. No murmurs. No  jugular venous distention.  No peripheral edema. PULMONARY: Clear to auscultation bilaterally. GASTROINTESTINAL: Abdomen is obese, soft, nontender,  nondistended. SKIN: Warm, dry, without lesions. NEUROLOGICAL: Nonfocal.  MUSCULOSKELETAL: Unremarkable. LABORATORY DATA: According to the paperwork that came with him,  his white count is normal, hemoglobin is 15 and normal, MCV normal,  platelet count normal, no bandemia noted. His troponin initially was  less than 0.1, but again I was told this had gone up as high as 4. Electrolytes are essentially normal with the exception of low  bicarbonate of 12 and an anion gap of 26. His glucose is elevated at  200. Creatinine is normal at 0.95. Lipase was 206. Albumin was 3.7. The rest of his liver testing is essentially unremarkable. Chest x-ray: Per remote read, no acute cardiopulmonary abnormality. He has 2 EKGs, both showing normal sinus rhythm without clear ST  elevations. IMPRESSION  1. Non-ST elevation myocardial infarction in this patient with risk  factors including history of coronary artery disease with right coronary  artery involvement. The cardiology team is aware. The patient currently  on nitroglycerin drip with some significant decrease in his chest pain. He is also on Integrilin and heparin drips. Continue Integrilin, nitroglycerin and heparin drip. Continue aspirin  daily. Add Plavix. Continue his beta blocker first dose now if his blood  pressure tolerates it. NPO after midnight. Case discussed extensively  with the cardiology team. Plan is to transfer him to CVT step-down. 2. He has a low bicarbonate and an elevated anion gap. ABG has not  been done. Unclear if this is some type of metabolic acidosis. Plan is to repeat his labs and continue to monitor. If still elevated, will  check lactic acid level. Check an ABG for comprehensive evaluation. 3. History of hypertension. Was on beta blocker in the outpatient  setting. His complete medication list is unknown.   4. History of dyslipidemia. 5. History of abdominal aortic aneurysm. No clear evidence of active  issues regarding this condition at this time. 6. Elevated blood sugars. No history of diabetes mellitus. 7. Regarding his outpatient regimen of medications, plan is to hold  everything for now with the exception of his beta blocker and aspirin. 8. Deep venous thrombosis prophylaxis. The patient on heparin drip. 9. Advanced directives discussed with the patient. He would like to  have a FULL CODE status.         MD MONIKA Cheng / BETH  D:  01/03/2017   20:49  T:  01/03/2017   21:27  Job #:  [id number]

## 2017-01-04 NOTE — PROGRESS NOTES
[facility]  [facility]    Patient: Ирина Nelson Age: 64 y.o. : 1955 MR#: [id number] SSN: [id number]  Date/Time: 2017 2:24 PM    Subjective/24-hour events:     No chest pain or SOB. Cath, PCI this AM.    Assessment:   NSTEMI s/p cath/PCI to RCA  CAD  Hyperglycemia  HTN  AAA hx    Plan:  Medical management as ordered. Disposition when OK with cardiology. Case discussed with:  [x]Patient  []Family  []Nursing  []Case Management  DVT Prophylaxis:  []Lovenox  []Hep SQ  []SCDs  []Coumadin   []On Heparin gtt    Objective:   VS:   Visit Vitals    /64    Pulse 73    Temp 98 °F (36.7 °C)    Resp 22    Ht 5' 10.98\" (1.803 m)    Wt 104 kg (229 lb 4.8 oz)    SpO2 98%    BMI 32 kg/m2      Tmax/24hrs: Temp (24hrs), Av.1 °F (36.7 °C), Min:97.7 °F (36.5 °C), Max:98.5 °F (36.9 °C)    Intake/Output Summary (Last 24 hours) at 17 1424  Last data filed at 17 0900   Gross per 24 hour   Intake           447.49 ml   Output             1350 ml   Net          -902.51 ml       General:  In NAD. Cardiovascular: RRR. Pulmonary:  Clear, no active wheezes. GI:  Abdomen soft. Extremities:  Warm, no ischemia. Neuro:  Somewhat groggy still, but appropriate.   Motor nonfocal.    Labs:    Recent Results (from the past 24 hour(s))   METABOLIC PANEL, BASIC    Collection Time: 17  9:15 PM   Result Value Ref Range    Sodium 138 136 - 145 mmol/L    Potassium 3.4 (L) 3.5 - 5.5 mmol/L    Chloride 104 100 - 108 mmol/L    CO2 25 21 - 32 mmol/L    Anion gap 9 3.0 - 18 mmol/L    Glucose 111 (H) 74 - 99 mg/dL    BUN 17 7.0 - 18 MG/DL    Creatinine 0.80 0.6 - 1.3 MG/DL    BUN/Creatinine ratio 21 (H) 12 - 20      GFR est AA >60 >60 ml/min/1.73m2    GFR est non-AA >60 >60 ml/min/1.73m2    Calcium 8.1 (L) 8.5 - 10.1 MG/DL   HEPATIC FUNCTION PANEL    Collection Time: 17  9:15 PM   Result Value Ref Range    Protein, total 6.1 (L) 6.4 - 8.2 g/dL    Albumin 3.6 3.4 - 5.0 g/dL    Globulin 2.5 2.0 - 4.0 g/dL    A-G Ratio 1.4 0.8 - 1.7      Bilirubin, total 0.8 0.2 - 1.0 MG/DL    Bilirubin, direct 0.2 0.0 - 0.2 MG/DL    Alk. phosphatase 61 45 - 117 U/L    AST 37 15 - 37 U/L    ALT 31 16 - 61 U/L   TSH 3RD GENERATION    Collection Time: 01/03/17  9:15 PM   Result Value Ref Range    TSH 2.66 0.36 - 3.74 uIU/mL   PROTHROMBIN TIME + INR    Collection Time: 01/03/17  9:15 PM   Result Value Ref Range    Prothrombin time 13.7 11.5 - 15.2 sec    INR 1.1 0.8 - 1.2     CBC WITH AUTOMATED DIFF    Collection Time: 01/03/17  9:15 PM   Result Value Ref Range    WBC 11.8 4.6 - 13.2 K/uL    RBC 4.15 (L) 4.70 - 5.50 M/uL    HGB 12.9 (L) 13.0 - 16.0 g/dL    HCT 37.9 36.0 - 48.0 %    MCV 91.3 74.0 - 97.0 FL    MCH 31.1 24.0 - 34.0 PG    MCHC 34.0 31.0 - 37.0 g/dL    RDW 13.4 11.6 - 14.5 %    PLATELET 958 (L) 391 - 420 K/uL    MPV 10.8 9.2 - 11.8 FL    NEUTROPHILS 75 (H) 40 - 73 %    LYMPHOCYTES 17 (L) 21 - 52 %    MONOCYTES 8 3 - 10 %    EOSINOPHILS 0 0 - 5 %    BASOPHILS 0 0 - 2 %    ABS. NEUTROPHILS 8.8 (H) 1.8 - 8.0 K/UL    ABS. LYMPHOCYTES 2.0 0.9 - 3.6 K/UL    ABS. MONOCYTES 1.0 0.05 - 1.2 K/UL    ABS. EOSINOPHILS 0.0 0.0 - 0.4 K/UL    ABS.  BASOPHILS 0.0 0.0 - 0.06 K/UL    DF AUTOMATED     PTT    Collection Time: 01/03/17  9:15 PM   Result Value Ref Range    aPTT 36.9 (H) 23.0 - 36.4 SEC   CARDIAC PANEL,(CK, CKMB & TROPONIN)    Collection Time: 01/03/17  9:15 PM   Result Value Ref Range     39 - 308 U/L    CK - MB 10.5 (H) 0.5 - 3.6 ng/ml    CK-MB Index 7.1 (H) 0.0 - 4.0 %    Troponin-I, Qt. 5.20 (HH) 0.0 - 0.045 NG/ML   EKG, 12 LEAD, SUBSEQUENT    Collection Time: 01/03/17  9:56 PM   Result Value Ref Range    Ventricular Rate 74 BPM    Atrial Rate 74 BPM    P-R Interval 170 ms    QRS Duration 86 ms    Q-T Interval 386 ms    QTC Calculation (Bezet) 428 ms    Calculated P Axis 38 degrees    Calculated R Axis 56 degrees    Calculated T Axis 24 degrees    Diagnosis       Normal sinus rhythm  Normal ECG  No previous ECGs available     PTT    Collection Time: 01/04/17  3:40 AM   Result Value Ref Range    aPTT 80.2 (H) 23.0 - 36.4 SEC   CARDIAC PANEL,(CK, CKMB & TROPONIN)    Collection Time: 01/04/17  3:40 AM   Result Value Ref Range     39 - 308 U/L    CK - MB 7.1 (H) 0.5 - 3.6 ng/ml    CK-MB Index 6.2 (H) 0.0 - 4.0 %    Troponin-I, Qt. 3.54 (HH) 0.0 - 0.045 NG/ML   PROTHROMBIN TIME + INR    Collection Time: 01/04/17  3:40 AM   Result Value Ref Range    Prothrombin time 13.8 11.5 - 15.2 sec    INR 1.1 0.8 - 1.2     METABOLIC PANEL, BASIC    Collection Time: 01/04/17  3:40 AM   Result Value Ref Range    Sodium 137 136 - 145 mmol/L    Potassium 3.6 3.5 - 5.5 mmol/L    Chloride 103 100 - 108 mmol/L    CO2 26 21 - 32 mmol/L    Anion gap 8 3.0 - 18 mmol/L    Glucose 118 (H) 74 - 99 mg/dL    BUN 14 7.0 - 18 MG/DL    Creatinine 0.75 0.6 - 1.3 MG/DL    BUN/Creatinine ratio 19 12 - 20      GFR est AA >60 >60 ml/min/1.73m2    GFR est non-AA >60 >60 ml/min/1.73m2    Calcium 8.5 8.5 - 10.1 MG/DL   LIPID PANEL    Collection Time: 01/04/17  3:40 AM   Result Value Ref Range    LIPID PROFILE          Cholesterol, total 169 <200 MG/DL    Triglyceride 404 (H) <150 MG/DL    HDL Cholesterol 29 (L) 40 - 60 MG/DL    LDL, calculated  0 - 100 MG/DL     LDL AND VLDL CHOLESTEROL NOT CALCULATED WHEN TRIGLYCERIDES >400 MG/DL OR HDL CHOLESTEROL <20 MG/DL    VLDL, calculated  MG/DL     Calculation not valid with this patient's other Lipid values.     CHOL/HDL Ratio 5.8 (H) 0 - 5.0     PTT    Collection Time: 01/04/17  9:03 AM   Result Value Ref Range    aPTT 134.2 (H) 23.0 - 36.4 SEC   CARDIAC PANEL,(CK, CKMB & TROPONIN)    Collection Time: 01/04/17  9:03 AM   Result Value Ref Range     39 - 308 U/L    CK - MB 5.4 (H) 0.5 - 3.6 ng/ml    CK-MB Index 5.3 (H) 0.0 - 4.0 %    Troponin-I, Qt. 2.63 (HH) 0.0 - 0.045 NG/ML   POC ACTIVATED CLOTTING TIME    Collection Time: 01/04/17 11:38 AM   Result Value Ref Range Activated Clotting Time (POC) 209 (H) 79 - 138 SECS   POC ACTIVATED CLOTTING TIME    Collection Time: 01/04/17 11:44 AM   Result Value Ref Range    Activated Clotting Time (POC) 358 (H) 79 - 138 SECS       Signed By: Nelson Riley MD     January 4, 2017 2:24 PM

## 2017-01-04 NOTE — ANESTHESIA POSTPROCEDURE EVALUATION
Post-Anesthesia Evaluation and Assessment    Patient: Jenifer Simmonds MRN: [id number]  SSN: [id number]    YOB: 1955  Age: 64 y.o. Sex: male       Cardiovascular Function/Vital Signs  Visit Vitals    /68    Pulse 85    Temp 36.7 °C (98 °F)    Resp 15    Ht 5' 10.98\" (1.803 m)    Wt 104 kg (229 lb 4.8 oz)    SpO2 99%    BMI 32 kg/m2       Patient is status post MAC anesthesia for * No procedures listed *. Nausea/Vomiting: None    Postoperative hydration reviewed and adequate. Pain:  Pain Scale 1: Numeric (0 - 10) (01/04/17 0600)  Pain Intensity 1: 0 (01/04/17 0600)   Managed    Neurological Status: At baseline    Mental Status and Level of Consciousness: Arousable    Pulmonary Status:   O2 Device: Oxygen mask (01/04/17 1216)   Adequate oxygenation and airway patent    Complications related to anesthesia: None    Post-anesthesia assessment completed.  No concerns    Signed By: Billy Moreno CRNA     January 4, 2017

## 2017-01-04 NOTE — PROCEDURES
CARDIAC CATHETERIZATION LABORATORY PROCEDURE REPORT    Kelley Diaz is a 64 y.o. male    Medical Record Number: [id number]    Primary Care Provider: Tomas Gallegos MD      Referral Provider: No ref. provider found    Procedure Date: 1/4/2017    INDICATIONS:  Non STEMI. MD PERFORMING PROCEDURE: 3947 Noris Mcleod MD    PROCEDURE:  Left heart catheterization, coronary angiography, left ventriculography and coronary artery stenting. ANESTHESIA: Moderate sedation and local anesthesia. EBL: 30-50 ml  Contrast: 200 ml  Radiation exposure: 1882 mGy    Risks, benefits, and alternatives were explained to the patient and appropriate informed consent was obtained prior to the procedure. The patient was brought to the cath lab in a post-absorptive state and he was prepped and draped in the usual sterile manner. Moderate sedation was achieved with the combination of Versed (midazolam) and Fentanyl. Lidocaine was used to secure local anesthesia. The right femoral artery was cannulated using a modified Seldinger technique and a 6F Telugu sheath was inserted. Six Hosea and JR4 catheters were used to obtain selective bilateral coronary angiograms in multiple projections. LV angiography was performed in the GUERRA projection using an angled 6F Telugu pigtail catheter and a hand injection. Pressures were recorded in the LV and during pull back across the aortic valve. The following were observed. FLUOROSCOPY: There was moderate significant calcification. HEMODYNAMIC / ANGIOGRAPHIC DATA  · Left ventricle: End diastolic pressure was 10 mmHg. Left ventriculography: The EF was estimated to be around 55%. There was no diagnostic segmental wall motion abnormality. · Aorta: There was no significant systolic pressure gradient across the aortic valve. · Mitral valve: There was no significant mitral regurgitation. · Coronary Angiography:  · The coronary circulation was right dominant.    · Left main: Angiographically normal.  · Left anterior descending: Mid 90-95% with visible thrombus. · Diagonal Branches: Angiographically normal.  · Circumflex: Angiographically normal.  · Obtuse Marginal Branches: Angiographically normal.  · Right coronary: Proximal 100%; distal collateralized from left coronary. INTERVENTION:  A weight adjusted loading dose of IV Angiomax was given and infusion of the drug was started . Using a 6 Western Margaux XB 3.5 guiding catheter, the lesion in the LAD coronary artery was crossed with a 0.014\" Balance guide wire. Predilatation was performed with 2.5 mm PTCA balloon ( 20 mm length) followed by stenting with  2.75 mm Xience YOLY (23 mm length) stent. Additional balloon expansion was carried out using higher inflation pressures. Intracoronary NTG was used during the procedure. Excellent angiographic results were observed and TACOS 3 flow was noted after the PCI. After an appropriate waiting phase final coronary angiograms were obtained and the catheter was withdrawn. Oral antiplatelet therapy was administered in the cardiac cath lab in the form of aspirin 325 mg and 300 mg Clopidogrel. The procedure was well tolerated and there was no apparent immediate complication. Hemostasis was performed using Angioseal device. The patient was transferred to the observation area with stable vital signs and in an asymptomatic state. SUMMARY:  Successful stenting of thrombotic mid LAD 90-95% to 0%. No apparent immediate complication. Moderate sedation was utilized for 113 minutes using Versed and fentanyl under my supervision. RECOMMENDATIONS:  Post-procedure care will focus on aggressive risk factor modification.      Electronically signed Jessy Ngyuen MD

## 2017-01-05 VITALS
RESPIRATION RATE: 40 BRPM | TEMPERATURE: 97.3 F | HEART RATE: 84 BPM | SYSTOLIC BLOOD PRESSURE: 126 MMHG | OXYGEN SATURATION: 97 % | DIASTOLIC BLOOD PRESSURE: 64 MMHG | HEIGHT: 71 IN | WEIGHT: 231.48 LBS | BODY MASS INDEX: 32.41 KG/M2

## 2017-01-05 LAB
ACT BLD: 209 SECS (ref 79–138)
APTT PPP: 25.3 SEC (ref 23–36.4)
ATRIAL RATE: 74 BPM
CALCULATED P AXIS, ECG09: 39 DEGREES
CALCULATED R AXIS, ECG10: 48 DEGREES
CALCULATED T AXIS, ECG11: 29 DEGREES
DIAGNOSIS, 93000: NORMAL
P-R INTERVAL, ECG05: 168 MS
Q-T INTERVAL, ECG07: 370 MS
QRS DURATION, ECG06: 88 MS
QTC CALCULATION (BEZET), ECG08: 410 MS
VENTRICULAR RATE, ECG03: 74 BPM

## 2017-01-05 PROCEDURE — 74011250637 HC RX REV CODE- 250/637: Performed by: INTERNAL MEDICINE

## 2017-01-05 PROCEDURE — 85730 THROMBOPLASTIN TIME PARTIAL: CPT | Performed by: INTERNAL MEDICINE

## 2017-01-05 PROCEDURE — 36415 COLL VENOUS BLD VENIPUNCTURE: CPT | Performed by: INTERNAL MEDICINE

## 2017-01-05 RX ORDER — CLOPIDOGREL BISULFATE 75 MG/1
75 TABLET ORAL DAILY
Qty: 30 TAB | Refills: 1 | Status: SHIPPED | OUTPATIENT
Start: 2017-01-05

## 2017-01-05 RX ORDER — ATORVASTATIN CALCIUM 40 MG/1
40 TABLET, FILM COATED ORAL
Qty: 30 TAB | Refills: 1 | Status: SHIPPED | OUTPATIENT
Start: 2017-01-05 | End: 2017-03-21 | Stop reason: SDUPTHER

## 2017-01-05 RX ORDER — METOPROLOL TARTRATE 25 MG/1
25 TABLET, FILM COATED ORAL 2 TIMES DAILY
Qty: 60 TAB | Refills: 1 | Status: SHIPPED | OUTPATIENT
Start: 2017-01-05

## 2017-01-05 RX ADMIN — Medication 10 ML: at 16:01

## 2017-01-05 RX ADMIN — METOPROLOL TARTRATE 25 MG: 25 TABLET ORAL at 08:45

## 2017-01-05 RX ADMIN — ASPIRIN 81 MG: 81 TABLET, COATED ORAL at 08:46

## 2017-01-05 RX ADMIN — Medication 10 ML: at 16:03

## 2017-01-05 RX ADMIN — DOCUSATE SODIUM 100 MG: 100 CAPSULE, LIQUID FILLED ORAL at 08:45

## 2017-01-05 RX ADMIN — CLOPIDOGREL BISULFATE 75 MG: 75 TABLET ORAL at 08:45

## 2017-01-05 NOTE — PROGRESS NOTES
Patient stated that he doesn't have a cardiologist in [place]; voiced that Dr. Kathie Martinez was his previous cardiologist and agreed to follow-up with Dr. Kathie Martinez as cardiologist; will assist as needed. Wife will provide transportation from the hospital; no other concerns voiced.

## 2017-01-05 NOTE — DISCHARGE SUMMARY
Discharge Summary    Patient: Ruthie Daigle MRN: [id number]  CSN: [id number]    YOB: 1955  Age: 64 y.o. Sex: male    DOA: 1/3/2017 LOS:  LOS: 2 days   Discharge Date: 1/5/2017     Admission Diagnoses:   Unstable angina  Acute coronary syndrome    Discharge Diagnoses:    NSTEMI s/p cath/PCI to RCA  CAD  Hyperglycemia  HTN  AAA hx      Discharge Condition: Stable    PHYSICAL EXAM  Visit Vitals    /64    Pulse 84    Temp 97.3 °F (36.3 °C)    Resp (!) 40    Ht 5' 10.98\" (1.803 m)    Wt 105 kg (231 lb 7.7 oz)    SpO2 97%    BMI 32.3 kg/m2       General: In NAD. HEENT: NCAT. Sclerae anicteric. Lungs:  CTAB, no R/W/R. Heart:  RRR. Abdomen: Soft, NTTP. Extremities: No edema or ischemia. Psych:   Mood normal.  Neurologic:  Alert/appropriate, motor nonfocal.      Hospital Course:   See admission H&P for full details of HPI. Patient admitted to stepdown bed with cardiology in consultation. He underwent cardiac catheterization with successful stenting of mid LAD lesion. Medical management has been optimized and clinical condition has remained stable post-procedure. Patient is felt to be medically stable for discharge home with outpatient follow up as advised. Consults:   Cardiology      Significant Diagnostic Studies:   Cath:  INTERVENTION:  A weight adjusted loading dose of IV Angiomax was given and infusion of the drug was started . Using a 6 Western Margaux XB 3.5 guiding catheter, the lesion in the LAD coronary artery was crossed with a 0.014\" Balance guide wire. Predilatation was performed with 2.5 mm PTCA balloon ( 20 mm length) followed by stenting with 2.75 mm Xience YOLY (23 mm length) stent. Additional balloon expansion was carried out using higher inflation pressures. Intracoronary NTG was used during the procedure. Excellent angiographic results were observed and TACOS 3 flow was noted after the PCI.   After an appropriate waiting phase final coronary angiograms were obtained and the catheter was withdrawn. Oral antiplatelet therapy was administered in the cardiac cath lab in the form of aspirin 325 mg and 300 mg Clopidogrel. The procedure was well tolerated and there was no apparent immediate complication. Hemostasis was performed using Angioseal device. The patient was transferred to the observation area with stable vital signs and in an asymptomatic state. SUMMARY: Successful stenting of thrombotic mid LAD 90-95% to 0%. No apparent immediate complication. 2D echo:  SUMMARY:  Left ventricle: Systolic function was normal. Ejection fraction was  estimated to be 55 %. No obvious wall motion abnormalities identified in  the views obtained. There was mild concentric hypertrophy. Doppler  parameters were consistent with abnormal left ventricular relaxation  (grade 1 diastolic dysfunction). Discharge Medications:     Current Discharge Medication List      START taking these medications    Details   clopidogrel (PLAVIX) 75 mg tab Take 1 Tab by mouth daily. Qty: 30 Tab, Refills: 1      !! atorvastatin (LIPITOR) 40 mg tablet Take 1 Tab by mouth nightly. Qty: 30 Tab, Refills: 1       !! - Potential duplicate medications found. Please discuss with provider. CONTINUE these medications which have CHANGED    Details   metoprolol tartrate (LOPRESSOR) 25 mg tablet Take 1 Tab by mouth two (2) times a day. Qty: 60 Tab, Refills: 1         CONTINUE these medications which have NOT CHANGED    Details   tamsulosin (FLOMAX) 0.4 mg capsule       sildenafil (REVATIO) 20 mg tablet Refills: 3      DULoxetine (CYMBALTA) 60 mg capsule Take 1 Cap by mouth daily. Qty: 90 Cap, Refills: 1    Associated Diagnoses: Other intervertebral disc displacement, lumbar region; Spinal stenosis, lumbar; Postlaminectomy syndrome      polyethylene glycol (MIRALAX) 17 gram packet Take 17 g by mouth. aspirin delayed-release 81 mg tablet Take 81 mg by mouth.       !! atorvastatin (LIPITOR) 40 mg tablet TAKE 1 TABLET BY MOUTH DAILY  Qty: 90 tablet, Refills: 3      tadalafil (CIALIS) 20 mg tablet Take 20 mg by mouth as needed. Qty: 15 Tab, Refills: 0      BENEFIBER, GUAR GUM, PO Take  by mouth. cpap machine kit Take  by inhalation nightly. !! - Potential duplicate medications found. Please discuss with provider. STOP taking these medications       fenofibrate micronized (LOFIBRA) 200 mg capsule Comments:   Reason for Stopping:         oxyCODONE IR (ROXICODONE) 5 mg immediate release tablet Comments:   Reason for Stopping:         pravastatin (PRAVACHOL) 40 mg tablet Comments:   Reason for Stopping:         oxyCODONE-acetaminophen (PERCOCET) 5-325 mg per tablet Comments:   Reason for Stopping:         traMADol (ULTRAM) 50 mg tablet Comments:   Reason for Stopping:         nystatin (MYCOSTATIN) 100,000 unit/mL suspension Comments:   Reason for Stopping:         ranitidine hcl 150 mg capsule Comments:   Reason for Stopping:         omega-3 acid ethyl esters (LOVAZA) 1 gram capsule Comments:   Reason for Stopping:         HYDROCODONE BIT/ACETAMINOPHEN (VICODIN PO) Comments:   Reason for Stopping:         DOCUSATE CALCIUM (STOOL SOFTENER PO) Comments:   Reason for Stopping:               Activity: As tolerated    Diet: Cardiac Diet    Follow-up: with PCP, Bailee Ardon MD in 1-2 weeks. Willy Centeno.  Tommie Montes MD  [facility]

## 2017-01-05 NOTE — PROGRESS NOTES
conducted an initial consultation and Spiritual Assessment for Giovanna Cook, who is a 64 y.o.,male. Patients Primary Language is: Maru Dean. According to the patients EMR Lutheran Affiliation is: Holiness.      The reason the Patient came to the hospital is:   Patient Active Problem List    Diagnosis Date Noted    ACS (acute coronary syndrome) ([facility] [address] 01/03/2017    Other intervertebral disc displacement, lumbar region 07/29/2016    Spinal stenosis, lumbar 07/29/2016    Postlaminectomy syndrome 07/29/2016    Displacement of lumbar intervertebral disc without myelopathy 02/10/2016    Spinal stenosis, lumbar region, without neurogenic claudication 02/10/2016    Postlaminectomy syndrome, lumbar region 02/10/2016    Lumbar postlaminectomy syndrome     Infection of skin 02/02/2016    Abdominal distension 11/25/2015    Epigastric pain 11/25/2015    Nausea and vomiting 11/25/2015    Postoperative state 11/25/2015    History of surgical removal of intestinal structure 11/25/2015    Small bowel obstruction ([facility] [address] 11/25/2015    Special screening for malignant neoplasms, colon 11/18/2015    Hyperlipidemia 05/08/2015    Routine general medical examination at a health care facility 05/05/2015    Candidiasis of skin 04/09/2015    Familial multiple polyposis syndrome 10/02/2013    Dizziness 08/22/2013    Sciatica 09/27/2011    History of hepatomegaly, thrombocytopenia, and leukopenia, etiology unclear 09/27/2011    Coronary atherosclerosis of native coronary artery with know total RCA in cath 1994. 03/21/2011    IGNACIO (obstructive sleep apnea) sleeping with CPAP 03/21/2011    AAA (abdominal aortic aneurysm), small infrarenal 03/21/2011    Osteoarthritis 03/21/2011    Dyslipidemia 03/21/2011    Morbidly obese (HCC)     Hernia of unspecified site of abdominal cavity without mention of obstruction or gangrene         The  provided the following Interventions:  Initiated a relationship of care and support. Patient was walking around in the ICU talking freely and in positives tones. His wife was visiting. Explored issues of alfredo, belief, spirituality and Zoroastrianism/ritual needs while hospitalized. Patient said he is [employer]. Listened empathically. Provided chaplaincy education. Provided information about [id number] including that a  visits Sikhism patients. Offered assurance of continued prayers on patient's behalf. Chart reviewed. The following outcomes were achieved:  Patient shared limited information about both their medical narrative and spiritual journey/beliefs. Patient processed feeling about current hospitalization. He said he was glad he came to the hospital because he said his health outcome would have been more serious if he had not. Patient expressed gratitude for the 's visit. Assessment:  Patient does not have any Zoroastrianism/cultural needs that will affect patients preferences in health care. Patient did not indicate any spiritual or Zoroastrianism issues which require Spiritual Care Services interventions at this time. Plan:  Chaplains will continue to follow and will provide pastoral care on an as needed/requested basis.  recommends bedside caregivers page  on duty if patient shows signs of acute spiritual or emotional distress.   Diego Diaz 68, [facility]  Office [phone]

## 2017-01-05 NOTE — CARDIO/PULMONARY
Cardiac rehab in to follow up with patient. Took the phone number for [facility] as he expressed yesterday wanting to be able to participate closer to home. Patient then got off on a tangent of everything being so expensive and having to have money to live. Explained that this was for his health and it would get him back to doing what he wanted and needed to do a little quicker. His wife was in room and she stated that they will definitely be looking into the program. He should be covered for program because he will meet his deductible being in hospital. Told him to think about the program. Wife asked him just to think about it and not be negative. She told me that she works at [employer] and will talk with them when she returns. Will continue to follow.

## 2017-01-05 NOTE — PROGRESS NOTES
[facility] - Progress Note  Admit Date: 1/3/2017    Assessment:     Hospital Problems  Date Reviewed: 12/19/2016          Codes Class Noted POA    ACS (acute coronary syndrome) ([facility] [address] ICD-10-CM: I24.9  ICD-9-CM: 411.1  1/3/2017 Unknown              -NSTEMI with peak troponin 5, ECG without ischemic changes. Transferred from [place]. Now s/p successful stenting of thrombotic mid LAD 1/4/2017.  -Normal LV function 55% by echo this admission. -CAD with totally obstructed right coronary artery and widely patent left coronary arteries with some left-to-right collaterals and normal left ventricular function in cardiac catheterization back in 1994. No ischemia with normal LV function on nuclear 2013. Lost to follow up since 2014.  -Dyslipidemia, no longer on statin. -IGNACIO no longer uses CPAP. -Asbestosis. -PTSD and anxiety.  -Obesity with recent intentional weight loss. -Contrast allergy, hives.  -History of hepatomegaly, thrombocytopenia, and leukopenia, now s/ppartial colectomy and cholecystectomy last year. Plan:     Doing well post PCI without recurrent CP. Continue ASA, plavix, statin, BB. Transfer to step down. Increase activity. Possible discharge later today. Patient needs cardiology follow-up, concerned about cost, will ask  to evaluate. Subjective:     No Cp.      Objective:      Patient Vitals for the past 8 hrs:   Temp Pulse Resp BP SpO2   01/05/17 0800 - 72 20 120/67 97 %   01/05/17 0700 98.2 °F (36.8 °C) - - - -   01/05/17 0600 - 78 13 124/71 95 %   01/05/17 0500 - 66 16 131/66 97 %   01/05/17 0400 - 94 - 115/56 97 %   01/05/17 0300 98.2 °F (36.8 °C) 86 22 124/74 96 %         Patient Vitals for the past 96 hrs:   Weight   01/05/17 0600 105 kg (231 lb 7.7 oz)   01/04/17 0536 104 kg (229 lb 4.8 oz)   01/03/17 2027 104 kg (229 lb 4.5 oz)                    Intake/Output Summary (Last 24 hours) at 01/05/17 1045  Last data filed at 01/05/17 0900   Gross per 24 hour   Intake 704 ml   Output             1350 ml   Net             -646 ml       Physical Exam:  General:  alert, cooperative, no distress, appears stated age  Neck:  no JVD  Lungs:  clear to auscultation bilaterally  Heart:  regular rate and rhythm, S1, S2 normal, no murmur, click, rub or gallop  Abdomen:  abdomen is soft without significant tenderness, masses, organomegaly or guarding  Extremities:  extremities normal, atraumatic, no cyanosis or edema. No hematoma with full peripheral pulses. Data Review:     Labs: Results:       Chemistry Recent Labs      01/04/17   1521  01/04/17   0340  01/03/17 2115   GLU  165*  118*  111*   NA  134*  137  138   K  3.6  3.6  3.4*   CL  101  103  104   CO2  26  26  25   BUN  12  14  17   CREA  0.91  0.75  0.80   CA  9.0  8.5  8.1*   AGAP  7  8  9   BUCR  13  19  21*   AP   --    --   61   TP   --    --   6.1*   ALB   --    --   3.6   GLOB   --    --   2.5   AGRAT   --    --   1.4      CBC w/Diff Recent Labs      01/03/17 2115   WBC  11.8   RBC  4.15*   HGB  12.9*   HCT  37.9   PLT  126*   GRANS  75*   LYMPH  17*   EOS  0      Cardiac Enzymes Lab Results   Component Value Date/Time    TROIQ 1.74 (HH) 01/04/2017 03:21 PM      Coagulation Recent Labs      01/04/17   2125  01/04/17   1521   01/04/17 0340  01/03/17 2115   PTP   --    --    --   13.8  13.7   INR   --    --    --   1.1  1.1   APTT  29.0  35.2   < >  80.2*  36.9*    < > = values in this interval not displayed. Lipid Panel Lab Results   Component Value Date/Time    Cholesterol, total 169 01/04/2017 03:40 AM    HDL Cholesterol 29 01/04/2017 03:40 AM    LDL, calculated  01/04/2017 03:40 AM     LDL AND VLDL CHOLESTEROL NOT CALCULATED WHEN TRIGLYCERIDES >400 MG/DL OR HDL CHOLESTEROL <20 MG/DL    VLDL, calculated  01/04/2017 03:40 AM     Calculation not valid with this patient's other Lipid values.     Triglyceride 404 01/04/2017 03:40 AM    CHOL/HDL Ratio 5.8 01/04/2017 03:40 AM      BNP No results found for: BNP, BNPP, XBNPT   Liver Enzymes Recent Labs      01/03/17 2115   TP  6.1*   ALB  3.6   AP  61   SGOT  37      Digoxin    Thyroid Studies Lab Results   Component Value Date/Time    TSH 2.66 01/03/2017 09:15 PM          Signed By: MONICA Guerrier     January 5, 2017

## 2017-01-05 NOTE — PROGRESS NOTES
Chart review by       The Rev.  Jorge Simeon, [address]  [facility] [phone] / [facility] [phone]

## 2017-01-05 NOTE — NURSE NAVIGATOR
Cardiac Nurse Navigator Initial Note       Date of  Admission: 1/3/2017  7:59 PM   Hospital Day: 2    Admission type:Elective      Patient Active Problem List    Diagnosis Date Noted    ACS (acute coronary syndrome) ([address] 01/03/2017    Other intervertebral disc displacement, lumbar region 07/29/2016    Spinal stenosis, lumbar 07/29/2016    Postlaminectomy syndrome 07/29/2016    Displacement of lumbar intervertebral disc without myelopathy 02/10/2016    Spinal stenosis, lumbar region, without neurogenic claudication 02/10/2016    Postlaminectomy syndrome, lumbar region 02/10/2016    Lumbar postlaminectomy syndrome     Infection of skin 02/02/2016    Abdominal distension 11/25/2015    Epigastric pain 11/25/2015    Nausea and vomiting 11/25/2015    Postoperative state 11/25/2015    History of surgical removal of intestinal structure 11/25/2015    Small bowel obstruction ([address] 11/25/2015    Special screening for malignant neoplasms, colon 11/18/2015    Hyperlipidemia 05/08/2015    Routine general medical examination at a [facility] care facility 05/05/2015    Candidiasis of skin 04/09/2015    Familial multiple polyposis syndrome 10/02/2013    Dizziness 08/22/2013    Sciatica 09/27/2011    History of hepatomegaly, thrombocytopenia, and leukopenia, etiology unclear 09/27/2011    Coronary atherosclerosis of native coronary artery with know total RCA in cath 1994. 03/21/2011    IGNACIO (obstructive sleep apnea) sleeping with CPAP 03/21/2011    AAA (abdominal aortic aneurysm), small infrarenal 03/21/2011    Osteoarthritis 03/21/2011    Dyslipidemia 03/21/2011    Morbidly obese ([address]     Hernia of unspecified site of abdominal cavity without mention of obstruction or gangrene       Adeline Holloway MD    Cardiologist: Ruby Weinstein    Past Medical History   Diagnosis Date    Aortic aneurysm, abdominal ([address]     Back pain     Diverticulitis     Hearing loss     Heart disease      ischemic    Hernia of unspecified site of abdominal cavity without mention of obstruction or gangrene     History of myocardial perfusion scan 08/30/2013     Med-sized, fixed inferior defect most likely artifact rather than prior infarction. No ischemia. No WMA. EF 55%.  Holter monitor, normal 09/09/2013     Normal Holter w/5 PVCs. No symptoms reported.  Hypercholesterolemia     Hyperlipidemia     Hypertension     Hypertriglyceridemia     Joint pain     Low back pain radiating to both legs     Lumbar postlaminectomy syndrome     Lung disease     Morbidly obese (HCC)     Osteoarthritis     S/P cardiac cath 03/09/1994     pRCA 100%. Otherwise patent coronaries. LVEDP 20-25. EF 70%.       Sciatica     Sleep apnea     [place] due [place]nel      [place]    Wears glasses       Past Surgical History   Procedure Laterality Date    Hx cyst removal  1976     pilonidal cyst removed below spine    Hx orthopaedic  9/03     microscopic knee surgery on left knee and \"pocket\" syndrome surgery on left knee    Hx carpal tunnel release       left wrist in November 2007, right wrist in December 2007    Pr abdomen surgery proc unlisted       pancreatitis    Hx gastrectomy       removal of gall bladder, small intestine    Cardiac catheterization  1/4/2017          Coronary artery angiogram  1/4/2017          Coronary stent single w/ptca  1/4/2017          Lv angiography  1/4/2017           Current Facility-Administered Medications   Medication Dose Route Frequency    metoprolol tartrate (LOPRESSOR) tablet 25 mg  25 mg Oral BID    sodium chloride (NS) flush 5-10 mL  5-10 mL IntraVENous Q8H    sodium chloride (NS) flush 5-10 mL  5-10 mL IntraVENous PRN    aspirin delayed-release tablet 81 mg  81 mg Oral DAILY    LORazepam (ATIVAN) injection 1 mg  1 mg IntraVENous Q10MIN PRN    alum-mag hydroxide-simeth (MYLANTA) oral suspension 30 mL  30 mL Oral Q4H PRN    sodium chloride (NS) flush 5-10 mL  5-10 mL IntraVENous Q8H    sodium chloride (NS) flush 5-10 mL  5-10 mL IntraVENous PRN    magnesium hydroxide (MILK OF MAGNESIA) 400 mg/5 mL oral suspension 30 mL  30 mL Oral DAILY PRN    docusate sodium (COLACE) capsule 100 mg  100 mg Oral BID    atorvastatin (LIPITOR) tablet 40 mg  40 mg Oral QHS    nitroglycerin (TRIDIL) 400 mcg/ml infusion  5-20 mcg/min IntraVENous TITRATE    clopidogrel (PLAVIX) tablet 75 mg  75 mg Oral DAILY        Prior to admission patient has having cp    Patient observed sitting [facility] in bed in [facility]    Cardiographics  Patient on Telemetry: Cardiac/Telemetry Monitor On: Yes   Cardiac Rhythm (only for patients on telemetry): Cardiac Rhythm: Normal sinus rhythm    Echocardiogram:  []  None ordered    [] Results Pending       Results:     EF: 55%          Labs:   Recent Results (from the past 24 hour(s))   PTT    Collection Time: 01/04/17  9:25 PM   Result Value Ref Range    aPTT 29.0 23.0 - 36.4 SEC   PTT    Collection Time: 01/05/17 10:35 AM   Result Value Ref Range    aPTT 25.3 23.0 - 36.4 SEC     No results found for: HBA1C, XMI4MTEE    Code Status: No Order    Patient would benefit from:  [x] Cardiac Rehab             [x]Home Health   [] PT  [] Case Management             [] H2H                           [] OT                                    [] Nutrition                              []  Apnea Link                [] 6 minute walk                            []  Outpatient sleep study  [] Other:         [] Palliative Care n/a     Education reinforced, patient and/or family given opportunity to ask questions. Talked with pt. About MI education. Pt. Wife said he is \"stubborn\". So encouraged pt. To follow doctor's orders and take his meds as directed. Has lot of stress in new home because of hunters hunting behind his house and in his yard. Encouraged to do some relaxation for stress relief and they may have to rethink their living area. Pt and wife agreed. Will follow.       Melody Griselda Bliss, RN

## 2017-01-06 ENCOUNTER — TELEPHONE (OUTPATIENT)
Dept: OTHER | Age: 62
End: 2017-01-06

## 2017-01-06 NOTE — TELEPHONE ENCOUNTER
Called patient and talked with wife with patient adding comments in the background. They have their appointments. The cardiology appointment was in question so told her to call them. They have all meds. They were unsure what he could do at home so went over activity, diet and relaxation to include no heavy lifting for at least one week and mild walking. Absolutely no shoveling of snow, reinforced more than once. Complaining of some sciatica which is getting better today. Also c/o nausea. Told her that if this continues to call doctor. Told her to keep diet light and always take meds with food. Told her if it continues or gets worse to call doctor. Also told them symptoms to look for that are concerning. No further questions. Given my contact info.

## 2017-01-18 ENCOUNTER — TELEPHONE (OUTPATIENT)
Dept: CARDIAC REHAB | Age: 62
End: 2017-01-18

## 2017-01-18 NOTE — TELEPHONE ENCOUNTER
Cardiac Rehab called and left a message on patients voicemail. Additional attempts to contact patient will be made.     Thank you,  Alyson Samuels

## 2017-03-06 ENCOUNTER — TELEPHONE (OUTPATIENT)
Dept: ORTHOPEDIC SURGERY | Age: 62
End: 2017-03-06

## 2017-03-06 NOTE — TELEPHONE ENCOUNTER
Pt called to inform Dr. Madonna Londono  that he has had 3 heart attacks and had a stent put in at [facility]. Pt says he is supposed to call Dr. Madonna Londono to inform him because of pain management. Pt states he has not been prescribed pain medication. If any questions please call pt at [phone].

## 2017-03-20 NOTE — TELEPHONE ENCOUNTER
Spoke with Mr. Tisha Garcia and informed him that he will need to be seen per Dr. Nilda Freeman. I scheduled the patient an appointment with tomorrow 3-21-17 at 21 [phone] and informed him that he needs to be here at 1000.

## 2017-03-20 NOTE — TELEPHONE ENCOUNTER
Pt states that the Tramadol is not working and he has to take 2 or 3 to get relief and he discussed this with Dr. Colette Willett and he did not want him to continue to take this and he thought he was being prescribed something different. Please advise [phone].

## 2017-03-20 NOTE — TELEPHONE ENCOUNTER
Pt called in states he is out of Tramadol 50 mg. States he spoke with Dr. Jose Hanks at last appt on 12/19/2016 and was told if he needed a refill to call in. Pt states that he doesn't take it often but when he does he has to take 2 in order for it to work. Pt has upcoming appt 05/15/17. Please advise at [phone].

## 2017-03-20 NOTE — TELEPHONE ENCOUNTER
Please advise.      Last Visit: 12/19/2016 with MD Jones Counts    Next Appointment: 05/15/2017 with MD Jones Counts

## 2017-03-21 ENCOUNTER — OFFICE VISIT (OUTPATIENT)
Dept: ORTHOPEDIC SURGERY | Age: 62
End: 2017-03-21

## 2017-03-21 VITALS
RESPIRATION RATE: 18 BRPM | WEIGHT: 228 LBS | DIASTOLIC BLOOD PRESSURE: 94 MMHG | HEART RATE: 84 BPM | HEIGHT: 70 IN | BODY MASS INDEX: 32.64 KG/M2 | SYSTOLIC BLOOD PRESSURE: 142 MMHG

## 2017-03-21 DIAGNOSIS — M51.26 OTHER INTERVERTEBRAL DISC DISPLACEMENT, LUMBAR REGION: ICD-10-CM

## 2017-03-21 DIAGNOSIS — M48.061 SPINAL STENOSIS, LUMBAR: ICD-10-CM

## 2017-03-21 DIAGNOSIS — M96.1 POSTLAMINECTOMY SYNDROME: Primary | ICD-10-CM

## 2017-03-21 RX ORDER — METHYLPREDNISOLONE 4 MG/1
TABLET ORAL
Qty: 1 DOSE PACK | Refills: 0 | Status: SHIPPED | OUTPATIENT
Start: 2017-03-21 | End: 2017-08-21 | Stop reason: ALTCHOICE

## 2017-03-21 NOTE — PROGRESS NOTES
[facility]  [address], [address]  Phone: [phone]  Fax: [phone]        PROGRESS NOTE      HISTORY OF PRESENT ILLNESS:  The patient is a 64 y.o. male and was seen today for follow up of low back pain extending into the right lower extremity. He states he has no pain but rather intermittent paraesthesia in his left lower extremity. The patient was diagnosed with lumbar postlaminectomy syndrome. Patient reports numbness had resolved in LLE after block until more recently. Symptoms exacerbated with activity. He reports intolerance to NEURONTIN. He is compliant with Cymbalta 60 mg daily. Patient has not been taking Tramadol. He has recently been prescribed Miralax. He denies hx of glaucoma. Lumbar spine MRI per report revealed developmentally small spinal canal and developmentally small foramina with superimposed degenerative facet disease. There were no focal disc protrusions or significant spinal canal stenosis. There were multiple levels of foraminal stenosis, partially developmental and partially due to degenerative facet changes. It was most marked at L3-L4 and L4-L5 and to a lesser degree at L2-L3. At his last clinic appointment, patient wished to continue his current treatment. I refilled his Cymbalta 60 mg once daily. The patient returns today an acute increase in RLE pain extending in an L4 distribution to the knee, which he describes as a burning sensation, concurrent with his myocardial infarction. He rates pain 5/10, consistent with his last visit (2-7/10). Pt reports he now ambulates with single point cane. I last saw patient on 12/19/16 when he was scheduled for a 6 month f/u but is following up earlier than scheduled today due to increased pain. Pt underwent cardiac stent placement recently at [facility] by Dr. Grisel Zee in 01/2017. He is now on Plavix. Pt is no longer being followed by Dr. Grisel Zee. He continues with Tramadol prn without relief. Pt is not a candidate for MRI at this time. He denies h/o glaucoma.  reviewed. Past Medical History:   Diagnosis Date    Aortic aneurysm, abdominal (HCC)     Back pain     Diverticulitis     Hearing loss     Heart disease     ischemic    Hernia of unspecified site of abdominal cavity without mention of obstruction or gangrene     History of myocardial perfusion scan 08/30/2013    Med-sized, fixed inferior defect most likely artifact rather than prior infarction. No ischemia. No WMA. EF 55%.  Holter monitor, normal 09/09/2013    Normal Holter w/5 PVCs. No symptoms reported.  Hypercholesterolemia     Hyperlipidemia     Hypertension     Hypertriglyceridemia     Joint pain     Low back pain radiating to both legs     Lumbar postlaminectomy syndrome     Lung disease     Morbidly obese (HCC)     Osteoarthritis     S/P cardiac cath 03/09/1994    pRCA 100%. Otherwise patent coronaries. LVEDP 20-25. EF 70%.  Sciatica     Sleep apnea     [place] due to [place]    Wears glasses         Social History     Social History    Marital status:      Spouse name: N/A    Number of children: N/A    Years of education: N/A     Occupational History    Not on file. Social History Main Topics    Smoking status: Former Smoker     Quit date: 1/1/2001    Smokeless tobacco: Never Used    Alcohol use Yes      Comment: occasional    Drug use: Not on file    Sexual activity: Not on file     Other Topics Concern    Not on file     Social History Narrative       Current Outpatient Prescriptions   Medication Sig Dispense Refill    methylPREDNISolone (MEDROL DOSEPACK) 4 mg tablet Per dose pack instructions 1 Dose Pack 0    metoprolol tartrate (LOPRESSOR) 25 mg tablet Take 1 Tab by mouth two (2) times a day. 60 Tab 1    clopidogrel (PLAVIX) 75 mg tab Take 1 Tab by mouth daily.  30 Tab 1    tamsulosin (FLOMAX) 0.4 mg capsule       DULoxetine (CYMBALTA) 60 mg capsule Take 1 Cap by mouth daily. 90 Cap 1    polyethylene glycol (MIRALAX) 17 gram packet Take 17 g by mouth.  aspirin delayed-release 81 mg tablet Take 81 mg by mouth.  cpap machine kit Take  by inhalation nightly.  sildenafil (REVATIO) 20 mg tablet   3    atorvastatin (LIPITOR) 40 mg tablet TAKE 1 TABLET BY MOUTH DAILY 90 tablet 3    tadalafil (CIALIS) 20 mg tablet Take 20 mg by mouth as needed. 15 Tab 0    BENEFIBER, GUAR GUM, PO Take  by mouth. Allergies   Allergen Reactions    Flu Vaccine 2011 (36 Mos+)(Pf) Anaphylaxis    Atorvastatin Other (comments)    Crestor [Rosuvastatin] Unknown (comments)     Intolerant; abdominal pain    Iodine Hives     IV dye    Ivp Dye [Fd And C Blue No.1] Swelling    Toradol [Ketorolac Tromethamine] Rash     Other reaction(s): Hives  Profuse sweating    Tricor [Fenofibrate Micronized] Other (comments)     Abdominal pain    Zetia [Ezetimibe] Unknown (comments)     Hot flashes        Ambulates with a single point cane. PHYSICAL EXAMINATION    Visit Vitals    BP (!) 142/94 (BP 1 Location: Left arm, BP Patient Position: Sitting)    Pulse 84    Resp 18    Ht 5' 10\" (1.778 m)    Wt 228 lb (103.4 kg)    BMI 32.71 kg/m2       CONSTITUTIONAL: NAD, A&O x 3  SENSATION: Intact to light touch throughout  RANGE OF MOTION: The patient has full passive range of motion in all four extremities. MOTOR:  Straight Leg Raise: Negative, bilateral               Hip Flex Knee Ext Knee Flex Ankle DF GTE Ankle PF Tone   Right +4/5 +4/5 +4/5 +4/5 +4/5 +4/5 +4/5   Left +4/5 +4/5 +4/5 +4/5 +4/5 +4/5 +4/5       ASSESSMENT   Nile Lundborg was seen today for back pain and leg pain.     Diagnoses and all orders for this visit:    Postlaminectomy syndrome    Spinal stenosis, lumbar    Other intervertebral disc displacement, lumbar region    Other orders  -     methylPREDNISolone (MEDROL DOSEPACK) 4 mg tablet; Per dose pack instructions          IMPRESSION AND PLAN:  He reports earlier than scheduled with an increase in RLE pain. Patient previously did not tolerate Neurontin well. I offered to try him on Topamax or Cymbalta. He declines. Patient is not a candidate for lumbar blocks at this time as he has been started on Plavix. I will start him on Medrol Dosepak. I will see the patient back as previously scheduled. Written by Anne Lopez, as dictated by Osiris Bosch MD  I examined the patient, reviewed and agree with the note.

## 2017-03-21 NOTE — MR AVS SNAPSHOT
Visit Information Date & Time Provider Department Dept. Phone Encounter #  
 3/21/2017 10:15 AM Jose Mcclellan  [facility], [address] and [facility] [place] [phone] [id number] Follow-up Instructions Return for as previously scheduled. Your Appointments 5/15/2017  9:15 AM  
Follow Up with Jose Mcclellan MD  
[facility] [place] ([facility]) Appt Note: 6 mo f/u lower back 2012 [place] [address]  
[address] [facility] Upcoming Health Maintenance Date Due Hepatitis C Screening 1955 DTaP/Tdap/Td series (1 - Tdap) 6/8/1976 FOBT Q 1 YEAR AGE 50-75 6/8/2005 ZOSTER VACCINE AGE 60> 6/8/2015 INFLUENZA AGE 9 TO ADULT 8/1/2016 Allergies as of 3/21/2017  Review Complete On: 3/21/2017 By: Javier Manning LPN Severity Noted Reaction Type Reactions Flu Vaccine 2011 (36 Mos+)(pf) High 01/11/2017    Anaphylaxis Atorvastatin Medium 03/17/2017    Other (comments) Crestor [Rosuvastatin]    Unknown (comments) Intolerant; abdominal pain Iodine  02/10/2016    Hives IV dye Ivp Dye [Fd And C Blue No.1]    Swelling Toradol [Ketorolac Tromethamine]    Rash Other reaction(s): Hives Profuse sweating Tricor [Fenofibrate Micronized]  03/21/2011    Other (comments) Abdominal pain Zetia [Ezetimibe]    Unknown (comments) Hot flashes Current Immunizations  Never Reviewed No immunizations on file. Not reviewed this visit You Were Diagnosed With   
  
 Codes Comments Postlaminectomy syndrome    -  Primary ICD-10-CM: M96.1 ICD-9-CM: 722.80 Spinal stenosis, lumbar     ICD-10-CM: M48.06 
ICD-9-CM: 724.02 Other intervertebral disc displacement, lumbar region     ICD-10-CM: M51.26 
ICD-9-CM: 722.10 Vitals BP Pulse Resp Height(growth percentile) Weight(growth percentile) BMI Vianney Barton ) 142/94 (BP 1 Location: Left arm, BP Patient Position: Sitting) 84 18 5' 10\" (1.778 m) 228 lb (103.4 kg) 32.71 kg/m2 Smoking Status Former Smoker Vitals History BMI and BSA Data Body Mass Index Body Surface Area 32.71 kg/m 2 2.26 m 2 Preferred Pharmacy Pharmacy Name Phone [facility] [address] [place] [address] [phone] Your Updated Medication List  
  
   
This list is accurate as of: 3/21/17 11:22 AM.  Always use your most recent med list.  
  
  
  
  
 aspirin delayed-release 81 mg tablet Take 81 mg by mouth. atorvastatin 40 mg tablet Commonly known as:  LIPITOR  
TAKE 1 TABLET BY MOUTH DAILY BENEFIBER (GUAR GUM) PO Take  by mouth. clopidogrel 75 mg Tab Commonly known as:  PLAVIX Take 1 Tab by mouth daily. cpap machine kit Take  by inhalation nightly. DULoxetine 60 mg capsule Commonly known as:  CYMBALTA Take 1 Cap by mouth daily. methylPREDNISolone 4 mg tablet Commonly known as:  Barbarann Punt Per dose pack instructions  
  
 metoprolol tartrate 25 mg tablet Commonly known as:  LOPRESSOR Take 1 Tab by mouth two (2) times a day. polyethylene glycol 17 gram packet Commonly known as:  Sri  Take 17 g by mouth.  
  
 sildenafil 20 mg tablet Commonly known as:  REVATIO  
  
 tadalafil 20 mg tablet Commonly known as:  CIALIS Take 20 mg by mouth as needed. tamsulosin 0.4 mg capsule Commonly known as:  FLOMAX Prescriptions Sent to Pharmacy Refills  
 methylPREDNISolone (MEDROL DOSEPACK) 4 mg tablet 0 Sig: Per dose pack instructions Class: Normal  
 Pharmacy: [facility] [address] [place] [address] Ph #: [phone] Follow-up Instructions Return for as previously scheduled. Introducing [facility] SERVICES! Marianne Garcia introduces Queryday patient portal. Now you can access parts of your medical record, email your doctor's office, and request medication refills online. 1. In your internet browser, go to [email or web address] 2. Click on the First Time User? Click Here link in the Sign In box. You will see the New Member Sign Up page. 3. Enter your Queryday Access Code exactly as it appears below. You will not need to use this code after youve completed the sign-up process. If you do not sign up before the expiration date, you must request a new code. · Queryday Access Code: [email or web address] Expires: 6/19/2017  9:53 AM 
 
4. Enter the last four digits of your Social Security Number (xxxx) and Date of Birth (mm/dd/yyyy) as indicated and click Submit. You will be taken to the next sign-up page. 5. Create a Queryday ID. This will be your Queryday login ID and cannot be changed, so think of one that is secure and easy to remember. 6. Create a Queryday password. You can change your password at any time. 7. Enter your Password Reset Question and Answer. This can be used at a later time if you forget your password. 8. Enter your e-mail address. You will receive e-mail notification when new information is available in [address] 9. Click Sign Up. You can now view and download portions of your medical record. 10. Click the Download Summary menu link to download a portable copy of your medical information. If you have questions, please visit the Frequently Asked Questions section of the Queryday website. Remember, Queryday is NOT to be used for urgent needs. For medical emergencies, dial 911. Now available from your iPhone and Android! Please provide this summary of care documentation to your next provider. If you have any questions after today's visit, please call [phone].

## 2017-05-15 ENCOUNTER — OFFICE VISIT (OUTPATIENT)
Dept: ORTHOPEDIC SURGERY | Age: 62
End: 2017-05-15

## 2017-05-15 VITALS
WEIGHT: 228 LBS | SYSTOLIC BLOOD PRESSURE: 139 MMHG | HEART RATE: 68 BPM | HEIGHT: 70 IN | DIASTOLIC BLOOD PRESSURE: 92 MMHG | BODY MASS INDEX: 32.64 KG/M2

## 2017-05-15 DIAGNOSIS — M96.1 POSTLAMINECTOMY SYNDROME, LUMBAR: ICD-10-CM

## 2017-05-15 DIAGNOSIS — M51.26 OTHER INTERVERTEBRAL DISC DISPLACEMENT, LUMBAR REGION: Primary | ICD-10-CM

## 2017-05-15 DIAGNOSIS — M48.061 SPINAL STENOSIS, LUMBAR: ICD-10-CM

## 2017-05-15 RX ORDER — DULOXETIN HYDROCHLORIDE 60 MG/1
60 CAPSULE, DELAYED RELEASE ORAL DAILY
Qty: 30 CAP | Refills: 2 | Status: SHIPPED | OUTPATIENT
Start: 2017-05-15 | End: 2018-05-08 | Stop reason: SDUPTHER

## 2017-08-21 ENCOUNTER — OFFICE VISIT (OUTPATIENT)
Dept: ORTHOPEDIC SURGERY | Age: 62
End: 2017-08-21

## 2017-08-21 VITALS
DIASTOLIC BLOOD PRESSURE: 94 MMHG | WEIGHT: 242 LBS | HEART RATE: 71 BPM | BODY MASS INDEX: 34.65 KG/M2 | HEIGHT: 70 IN | SYSTOLIC BLOOD PRESSURE: 134 MMHG

## 2017-08-21 DIAGNOSIS — M96.1 POSTLAMINECTOMY SYNDROME: Primary | ICD-10-CM

## 2017-08-21 DIAGNOSIS — M48.061 SPINAL STENOSIS, LUMBAR: ICD-10-CM

## 2017-08-21 DIAGNOSIS — M51.26 OTHER INTERVERTEBRAL DISC DISPLACEMENT, LUMBAR REGION: ICD-10-CM

## 2017-08-21 NOTE — MR AVS SNAPSHOT
Visit Information Date & Time Provider Department Dept. Phone Encounter #  
 8/21/2017 10:15 AM Gil Olivera MD [facility], [address] and [facility] [place] [phone] [id number] Follow-up Instructions Return in about 3 months (around 11/21/2017). Upcoming Health Maintenance Date Due Hepatitis C Screening 1955 DTaP/Tdap/Td series (1 - Tdap) 6/8/1976 FOBT Q 1 YEAR AGE 50-75 6/8/2005 ZOSTER VACCINE AGE 60> 4/8/2015 INFLUENZA AGE 9 TO ADULT 8/1/2017 Allergies as of 8/21/2017  Review Complete On: 8/21/2017 By: Gil Olivera MD  
  
 Severity Noted Reaction Type Reactions Flu Vaccine 2011 (36 Mos+)(pf) High 01/11/2017    Anaphylaxis Atorvastatin Medium 03/17/2017    Other (comments) Crestor [Rosuvastatin]    Unknown (comments) Intolerant; abdominal pain Iodine  02/10/2016    Hives IV dye Ivp Dye [Fd And C Blue No.1]    Swelling Toradol [Ketorolac Tromethamine]    Rash Other reaction(s): Hives Profuse sweating Tricor [Fenofibrate Micronized]  03/21/2011    Other (comments) Abdominal pain Zetia [Ezetimibe]    Unknown (comments) Hot flashes Current Immunizations  Never Reviewed No immunizations on file. Not reviewed this visit You Were Diagnosed With   
  
 Codes Comments Postlaminectomy syndrome    -  Primary ICD-10-CM: M96.1 ICD-9-CM: 722.80 Other intervertebral disc displacement, lumbar region     ICD-10-CM: M51.26 
ICD-9-CM: 722.10 Spinal stenosis, lumbar     ICD-10-CM: M48.06 
ICD-9-CM: 724.02 Vitals BP Pulse Height(growth percentile) Weight(growth percentile) BMI Smoking Status (!) 134/94 71 5' 10\" (1.778 m) 242 lb (109.8 kg) 34.72 kg/m2 Former Smoker Vitals History BMI and BSA Data Body Mass Index Body Surface Area 34.72 kg/m 2 2.33 m 2 Preferred Pharmacy Pharmacy Name Phone [facility] [address] [facility] [address], [address] [place] [address] [address] [facility] [phone] Your Updated Medication List  
  
   
This list is accurate as of: 8/21/17 11:26 AM.  Always use your most recent med list.  
  
  
  
  
 aspirin delayed-release 81 mg tablet Take 81 mg by mouth. atorvastatin 40 mg tablet Commonly known as:  LIPITOR  
TAKE 1 TABLET BY MOUTH DAILY BENEFIBER (GUAR GUM) PO Take  by mouth. clopidogrel 75 mg Tab Commonly known as:  PLAVIX Take 1 Tab by mouth daily. cpap machine kit Take  by inhalation nightly. DULoxetine 60 mg capsule Commonly known as:  CYMBALTA Take 1 Cap by mouth daily. metoprolol tartrate 25 mg tablet Commonly known as:  LOPRESSOR Take 1 Tab by mouth two (2) times a day. polyethylene glycol 17 gram packet Commonly known as:  Sascha Coma Take 17 g by mouth.  
  
 sildenafil 20 mg tablet Commonly known as:  REVATIO  
  
 tadalafil 20 mg tablet Commonly known as:  CIALIS Take 20 mg by mouth as needed. tamsulosin 0.4 mg capsule Commonly known as:  FLOMAX Follow-up Instructions Return in about 3 months (around 11/21/2017). Introducing [facility] SERVICES! [employer] introduces HealthiNation patient portal. Now you can access parts of your medical record, email your doctor's office, and request medication refills online. 1. In your internet browser, go to [email or web address] 2. Click on the First Time User? Click Here link in the Sign In box. You will see the New Member Sign Up page. 3. Enter your HealthiNation Access Code exactly as it appears below. You will not need to use this code after youve completed the sign-up process. If you do not sign up before the expiration date, you must request a new code. · HealthiNation Access Code: [email or web address] Expires: 11/19/2017 10:16 AM 
 
 4. Enter the last four digits of your Social Security Number (xxxx) and Date of Birth (mm/dd/yyyy) as indicated and click Submit. You will be taken to the next sign-up page. 5. Create a Labochema ID. This will be your Labochema login ID and cannot be changed, so think of one that is secure and easy to remember. 6. Create a Labochema password. You can change your password at any time. 7. Enter your Password Reset Question and Answer. This can be used at a later time if you forget your password. 8. Enter your e-mail address. You will receive e-mail notification when new information is available in [address] 9. Click Sign Up. You can now view and download portions of your medical record. 10. Click the Download Summary menu link to download a portable copy of your medical information. If you have questions, please visit the Frequently Asked Questions section of the Labochema website. Remember, Labochema is NOT to be used for urgent needs. For medical emergencies, dial 911. Now available from your iPhone and Android! Please provide this summary of care documentation to your next provider. If you have any questions after today's visit, please call [phone].

## 2017-08-21 NOTE — PROGRESS NOTES
[facility]  [address], [place]   Phone: [phone]  Fax: [phone]        PROGRESS NOTE      HISTORY OF PRESENT ILLNESS:  The patient is a 58 y.o. male and was seen today for follow up of low back pain occasionally extending into the RLE extending in an S1 distribution to the ankle, which previously extended in an L4 distribution to the knee. He describes pain as a burning sensation. Pt continues to endorse left anterior thigh numbness. The patient was diagnosed with lumbar postlaminectomy syndrome. Symptoms exacerbated with activity. He reports intolerance to NEURONTIN. Pt is compliant with Cymbalta 60 mg daily. He continues with Tramadol prn without relief. Pt completed Medrol Dosepak with slight relief. Pt has h/o myocardial infarction x 3. He underwent cardiac stent placement recently at [facility] by Dr. Carolin Dill in 1/2017. He is on Plavix. Pt is no longer being followed by Dr. Carolin Dill. Pt denies hx of glaucoma. Pt reports of chronic bowel incontinence since his bowel resection from one year ago and chronic bladder incontinence ~ x 10 years. He states he is being evaluated in [place] for possible lung cancer. Pt reports of chronic suicidal ideation but has no plans on harming himself at this time. His PCP was Dr. Jamaal Galvin but has stopped seeing him as he lives in [place]. Pt is not actively being followed by a psychiatrist. Pt is seeing multiple providers for multiple problems but cannot provide me with any information to contact any of them. Pt is not a candidate for MRI at this time due to cardiac stent placement. He denies h/o glaucoma. Lumbar spine MRI per report revealed developmentally small spinal canal and developmentally small foramina with superimposed degenerative facet disease. There were no focal disc protrusions or significant spinal canal stenosis.  There were multiple levels of foraminal stenosis, partially developmental and partially due to degenerative facet changes. It was most marked at L3-L4 and L4-L5 and to a lesser degree at L2-L3. At his last clinic appointment, I had limited information regarding who his PCP or other providers for his other medical issues. Patient wished to continue his current treatment. He was provided with refills of Cymbalta 60 mg per day. The patient returns today with centralized low back pain. Pt denies radicular symptoms at this time. He is unable to quantify the level of his pain but states it is mild at this time. Pt is a poor historian. Per patient, he has seen his PCP in [place], whom he still cannot recall the name of. Pt is compliant with Cymbalta 60 mg per day. He has taken Tramadol three times since his last visit with me which does slight relieve his pain. Pt is on Plavix which per patient is likely chronic. He is seeing a new cardiologist in the near future (whom he does not know the name of).  reviewed. Past Medical History:   Diagnosis Date    Aortic aneurysm, abdominal (HCC)     Back pain     Diverticulitis     Hearing loss     Heart disease     ischemic    Hernia of unspecified site of abdominal cavity without mention of obstruction or gangrene     History of myocardial perfusion scan 08/30/2013    Med-sized, fixed inferior defect most likely artifact rather than prior infarction. No ischemia. No WMA. EF 55%.  Holter monitor, normal 09/09/2013    Normal Holter w/5 PVCs. No symptoms reported.  Hypercholesterolemia     Hyperlipidemia     Hypertension     Hypertriglyceridemia     Joint pain     Low back pain radiating to both legs     Lumbar postlaminectomy syndrome     Lung disease     Morbidly obese (HCC)     Osteoarthritis     S/P cardiac cath 03/09/1994    pRCA 100%. Otherwise patent coronaries. LVEDP 20-25. EF 70%.       Sciatica     Sleep apnea     [place] injury due to shrapnel     [place]    Wears glasses         Social History Social History    Marital status:      Spouse name: N/A    Number of children: N/A    Years of education: N/A     Occupational History    Not on file. Social History Main Topics    Smoking status: Former Smoker     Quit date: 1/1/2001    Smokeless tobacco: Never Used    Alcohol use Yes      Comment: occasional    Drug use: Not on file    Sexual activity: Not on file     Other Topics Concern    Not on file     Social History Narrative       Current Outpatient Prescriptions   Medication Sig Dispense Refill    DULoxetine (CYMBALTA) 60 mg capsule Take 1 Cap by mouth daily. 30 Cap 2    metoprolol tartrate (LOPRESSOR) 25 mg tablet Take 1 Tab by mouth two (2) times a day. 60 Tab 1    clopidogrel (PLAVIX) 75 mg tab Take 1 Tab by mouth daily. 30 Tab 1    tamsulosin (FLOMAX) 0.4 mg capsule       sildenafil (REVATIO) 20 mg tablet   3    polyethylene glycol (MIRALAX) 17 gram packet Take 17 g by mouth.  aspirin delayed-release 81 mg tablet Take 81 mg by mouth.  cpap machine kit Take  by inhalation nightly.  atorvastatin (LIPITOR) 40 mg tablet TAKE 1 TABLET BY MOUTH DAILY (Patient not taking: Reported on 8/21/2017) 90 tablet 3    tadalafil (CIALIS) 20 mg tablet Take 20 mg by mouth as needed. (Patient not taking: Reported on 8/21/2017) 15 Tab 0    BENEFIBER, GUAR GUM, PO Take  by mouth.          Allergies   Allergen Reactions    Flu Vaccine 2011 (36 Mos+)(Pf) Anaphylaxis    Atorvastatin Other (comments)    Crestor [Rosuvastatin] Unknown (comments)     Intolerant; abdominal pain    Iodine Hives     IV dye    Ivp Dye [Fd And C Blue No.1] Swelling    Toradol [Ketorolac Tromethamine] Rash     Other reaction(s): Hives  Profuse sweating    Tricor [Fenofibrate Micronized] Other (comments)     Abdominal pain    Zetia [Ezetimibe] Unknown (comments)     Hot flashes          PHYSICAL EXAMINATION    Visit Vitals    BP (!) 134/94    Pulse 71    Ht 5' 10\" (1.778 m)    Wt 242 lb (109.8 kg)    BMI 34.72 kg/m2       CONSTITUTIONAL: NAD, A&O x 3  SENSATION: Decreased sensation to light touch at L5 of the LLE. Sensation to light touch otherwise intact. RANGE OF MOTION: The patient has full passive range of motion in all four extremities. MOTOR:  Straight Leg Raise: Negative, bilateral               Hip Flex Knee Ext Knee Flex Ankle DF GTE Ankle PF Tone   Right +4/5 +4/5 +4/5 +4/5 +4/5 +4/5 +4/5   Left +4/5 +4/5 +4/5 +4/5 +4/5 +4/5 +4/5       ASSESSMENT   Diagnoses and all orders for this visit:    1. Postlaminectomy syndrome    2. Other intervertebral disc displacement, lumbar region    3. Spinal stenosis, lumbar          IMPRESSION AND PLAN:  His pain appears to have centralized to his lower back. His treatment options are limited until I know who his PCP and cardiologist are. Patient wished to continue his current treatment. He does not need refills of his Cymbalta 60 mg per day at this time. I will see the patient back in 3 month's time or earlier if needed. Written by Farideh Garibay, as dictated by Eliza Hopkins MD  I examined the patient, reviewed and agree with the note.

## 2017-10-20 NOTE — TELEPHONE ENCOUNTER
Patient called in states he needs a refill on his DULoxetine (CYMBALTA) 60 mg. Patient would like it sent to [facility] in [place] in his chart.  Patient can be reached at [phone]

## 2017-11-07 RX ORDER — DULOXETIN HYDROCHLORIDE 60 MG/1
60 CAPSULE, DELAYED RELEASE ORAL DAILY
Qty: 30 CAP | Refills: 2 | OUTPATIENT
Start: 2017-11-07

## 2017-11-07 NOTE — TELEPHONE ENCOUNTER
Spoke to the patient and he states that he has enough medication to get him to his appointment with Dr. Yu Manning. Please refuse the medication and close the message.

## 2017-11-21 ENCOUNTER — OFFICE VISIT (OUTPATIENT)
Dept: ORTHOPEDIC SURGERY | Age: 62
End: 2017-11-21

## 2017-11-21 VITALS
SYSTOLIC BLOOD PRESSURE: 113 MMHG | BODY MASS INDEX: 34.07 KG/M2 | HEART RATE: 66 BPM | WEIGHT: 238 LBS | HEIGHT: 70 IN | DIASTOLIC BLOOD PRESSURE: 60 MMHG

## 2017-11-21 DIAGNOSIS — M48.061 SPINAL STENOSIS, LUMBAR REGION, WITHOUT NEUROGENIC CLAUDICATION: Primary | ICD-10-CM

## 2017-11-21 DIAGNOSIS — M96.1 LUMBAR POSTLAMINECTOMY SYNDROME: ICD-10-CM

## 2017-11-21 DIAGNOSIS — M51.26 OTHER INTERVERTEBRAL DISC DISPLACEMENT, LUMBAR REGION: ICD-10-CM

## 2017-11-21 RX ORDER — DULOXETIN HYDROCHLORIDE 60 MG/1
60 CAPSULE, DELAYED RELEASE ORAL DAILY
Qty: 90 CAP | Refills: 1 | Status: SHIPPED | OUTPATIENT
Start: 2017-11-21 | End: 2018-08-13 | Stop reason: SDUPTHER

## 2017-11-21 RX ORDER — ISOSORBIDE MONONITRATE 30 MG/1
TABLET, EXTENDED RELEASE ORAL DAILY
COMMUNITY
End: 2018-05-08 | Stop reason: ALTCHOICE

## 2017-11-21 NOTE — MR AVS SNAPSHOT
Visit Information Date & Time Provider Department Dept. Phone Encounter #  
 11/21/2017 10:15 AM [facility] [address] [facility] [place] [phone] [id number] Follow-up Instructions Return in about 6 months (around 5/21/2018). Upcoming Health Maintenance Date Due Hepatitis C Screening 1955 DTaP/Tdap/Td series (1 - Tdap) 6/8/1976 FOBT Q 1 YEAR AGE 50-75 6/8/2005 ZOSTER VACCINE AGE 60> 4/8/2015 Influenza Age 5 to Adult 8/1/2017 Allergies as of 11/21/2017  Review Complete On: 11/21/2017 By: Angelina Murcia MD  
  
 Severity Noted Reaction Type Reactions Flu Vaccine 2011 (36 Mos+)(pf) High 01/11/2017    Anaphylaxis Atorvastatin Medium 03/17/2017    Other (comments) Crestor [Rosuvastatin]    Unknown (comments) Intolerant; abdominal pain Iodine  02/10/2016    Hives IV dye Ivp Dye [Fd And C Blue No.1]    Swelling Toradol [Ketorolac Tromethamine]    Rash Other reaction(s): Hives Profuse sweating Tricor [Fenofibrate Micronized]  03/21/2011    Other (comments) Abdominal pain Zetia [Ezetimibe]    Unknown (comments) Hot flashes Current Immunizations  Never Reviewed No immunizations on file. Not reviewed this visit You Were Diagnosed With   
  
 Codes Comments Spinal stenosis, lumbar region, without neurogenic claudication    -  Primary ICD-10-CM: M48.061 
ICD-9-CM: 724.02 Lumbar postlaminectomy syndrome     ICD-10-CM: M96.1 ICD-9-CM: 722.83 Other intervertebral disc displacement, lumbar region     ICD-10-CM: M51.26 
ICD-9-CM: 722.10 Vitals BP Pulse Height(growth percentile) Weight(growth percentile) BMI Smoking Status 113/60 66 5' 10\" (1.778 m) 238 lb (108 kg) 34.15 kg/m2 Former Smoker Vitals History BMI and BSA Data Body Mass Index Body Surface Area  
 34.15 kg/m 2 2.31 m 2 Preferred Pharmacy Pharmacy Name Phone [address] [phone] Your Updated Medication List  
  
   
This list is accurate as of: 11/21/17 11:23 AM.  Always use your most recent med list.  
  
  
  
  
 aspirin delayed-release 81 mg tablet Take 81 mg by mouth. clopidogrel 75 mg Tab Commonly known as:  PLAVIX Take 1 Tab by mouth daily. cpap machine kit Take  by inhalation nightly. * DULoxetine 60 mg capsule Commonly known as:  CYMBALTA Take 1 Cap by mouth daily. * DULoxetine 60 mg capsule Commonly known as:  CYMBALTA Take 1 Cap by mouth daily. isosorbide mononitrate ER 30 mg tablet Commonly known as:  IMDUR Take  by mouth daily. metoprolol tartrate 25 mg tablet Commonly known as:  LOPRESSOR Take 1 Tab by mouth two (2) times a day. ONE-A-DAY MEN'S PO Take  by mouth. tamsulosin 0.4 mg capsule Commonly known as:  FLOMAX * Notice: This list has 2 medication(s) that are the same as other medications prescribed for you. Read the directions carefully, and ask your doctor or other care provider to review them with you. Prescriptions Sent to Pharmacy Refills DULoxetine (CYMBALTA) 60 mg capsule 1 Sig: Take 1 Cap by mouth daily. Class: Normal  
 Pharmacy: [facility] [address] Ph #: [phone] Route: Oral  
  
Follow-up Instructions Return in about 6 months (around 5/21/2018). Introducing [facility] SERVICES! Deion Jalloh introduces Cloupia patient portal. Now you can access parts of your medical record, email your doctor's office, and request medication refills online. 1. In your internet browser, go to [email or web address] 2. Click on the First Time User? Click Here link in the Sign In box. You will see the New Member Sign Up page. 3. Enter your Ludi Access Code exactly as it appears below. You will not need to use this code after youve completed the sign-up process. If you do not sign up before the expiration date, you must request a new code. · Ludi Access Code: [email or web address] Expires: 2/18/2018  3:34 PM 
 
4. Enter the last four digits of your Social Security Number (xxxx) and Date of Birth (mm/dd/yyyy) as indicated and click Submit. You will be taken to the next sign-up page. 5. Create a Ludi ID. This will be your Ludi login ID and cannot be changed, so think of one that is secure and easy to remember. 6. Create a Ludi password. You can change your password at any time. 7. Enter your Password Reset Question and Answer. This can be used at a later time if you forget your password. 8. Enter your e-mail address. You will receive e-mail notification when new information is available in [address] 9. Click Sign Up. You can now view and download portions of your medical record. 10. Click the Download Summary menu link to download a portable copy of your medical information. If you have questions, please visit the Frequently Asked Questions section of the Ludi website. Remember, Ludi is NOT to be used for urgent needs. For medical emergencies, dial 911. Now available from your iPhone and Android! Please provide this summary of care documentation to your next provider. Your primary care clinician is listed as Loye Siemens. If you have any questions after today's visit, please call [phone].

## 2017-11-21 NOTE — PROGRESS NOTES
[facility]  [address], [place]   Phone: [phone]  Fax: [phone]        PROGRESS NOTE      HISTORY OF PRESENT ILLNESS:  The patient is a 58 y.o. male and was seen today for follow up of centralized low back pain. Pt denies radicular symptoms at this time which were noted to extend into the  RLE in an S1 distribution to the ankle. He describes pain as a burning sensation. Pt continues to endorse left anterior thigh numbness. The patient was diagnosed with lumbar postlaminectomy syndrome. Symptoms exacerbated with activity. He reports intolerance to NEURONTIN. Pt is compliant with Cymbalta 60 mg daily. He continues with Tramadol prn without relief. Pt completed Medrol Dosepak with slight relief. Pt has h/o myocardial infarction x 3. He underwent cardiac stent placement recently at [facility] by Dr. Elver Lees in 1/2017. He is on Plavix. Pt is no longer being followed by Dr. Elver Lees. Pt denies hx of glaucoma. Pt reports of chronic bowel incontinence since his bowel resection from one year ago and chronic bladder incontinence ~ x 10 years. He states he is being evaluated in [place] for possible lung cancer. Pt reports of chronic suicidal ideation but has no plans on harming himself at this time. His PCP was Dr. Shannon Lou but has stopped seeing him as he lives in [place]. Pt is not actively being followed by a psychiatrist. Pt is on Plavix which per patient is likely chronic. He is seeing a new cardiologist (Dr. Blayne Gage). Pt is not a candidate for MRI at this time due to cardiac stent placement. He denies h/o glaucoma. Lumbar spine MRI per report revealed developmentally small spinal canal and developmentally small foramina with superimposed degenerative facet disease. There were no focal disc protrusions or significant spinal canal stenosis.  There were multiple levels of foraminal stenosis, partially developmental and partially due to degenerative facet changes. It was most marked at L3-L4 and L4-L5 and to a lesser degree at L2-L3. At his last clinic appointment, his pain appeared to have centralized to his lower back. His treatment options were limited until I know who his PCP and cardiologist are. Patient wished to continue his current treatment. He djd not need refills of his Cymbalta 60 mg per day at that time. The patient returns today with with pain location and distribution remain unchanged. He rates pain 3/10. Pt is a poor historian. Pt is compliant with Cymbalta 60 mg per day. He reports rare use of Tramadol.  reviewed. Past Medical History:   Diagnosis Date    Aortic aneurysm, abdominal (HCC)     Back pain     Diverticulitis     Hearing loss     Heart disease     ischemic    Hernia of unspecified site of abdominal cavity without mention of obstruction or gangrene     History of myocardial perfusion scan 08/30/2013    Med-sized, fixed inferior defect most likely artifact rather than prior infarction. No ischemia. No WMA. EF 55%.  Holter monitor, normal 09/09/2013    Normal Holter w/5 PVCs. No symptoms reported.  Hypercholesterolemia     Hyperlipidemia     Hypertension     Hypertriglyceridemia     Joint pain     Low back pain radiating to both legs     Lumbar postlaminectomy syndrome     Lung disease     Morbidly obese (HCC)     Osteoarthritis     S/P cardiac cath 03/09/1994    pRCA 100%. Otherwise patent coronaries. LVEDP 20-25. EF 70%.  Sciatica     Sleep apnea     [place] due [place]    Wears glasses         Social History     Social History    Marital status:      Spouse name: N/A    Number of children: N/A    Years of education: N/A     Occupational History    Not on file.      Social History Main Topics    Smoking status: Former Smoker     Quit date: 1/1/2001    Smokeless tobacco: Never Used    Alcohol use Yes      Comment: occasional    Drug use: Not on file    Sexual activity: Not on file     Other Topics Concern    Not on file     Social History Narrative       Current Outpatient Prescriptions   Medication Sig Dispense Refill    isosorbide mononitrate ER (IMDUR) 30 mg tablet Take  by mouth daily.  MULTIVIT-MINERALS/FA/LYCOPENE (ONE-A-DAY MEN'S PO) Take  by mouth.  DULoxetine (CYMBALTA) 60 mg capsule Take 1 Cap by mouth daily. 90 Cap 1    DULoxetine (CYMBALTA) 60 mg capsule Take 1 Cap by mouth daily. 30 Cap 2    metoprolol tartrate (LOPRESSOR) 25 mg tablet Take 1 Tab by mouth two (2) times a day. 60 Tab 1    clopidogrel (PLAVIX) 75 mg tab Take 1 Tab by mouth daily. 30 Tab 1    tamsulosin (FLOMAX) 0.4 mg capsule       aspirin delayed-release 81 mg tablet Take 81 mg by mouth.  cpap machine kit Take  by inhalation nightly. Allergies   Allergen Reactions    Flu Vaccine 2011 (36 Mos+)(Pf) Anaphylaxis    Atorvastatin Other (comments)    Crestor [Rosuvastatin] Unknown (comments)     Intolerant; abdominal pain    Iodine Hives     IV dye    Ivp Dye [Fd And C Blue No.1] Swelling    Toradol [Ketorolac Tromethamine] Rash     Other reaction(s): Hives  Profuse sweating    Tricor [Fenofibrate Micronized] Other (comments)     Abdominal pain    Zetia [Ezetimibe] Unknown (comments)     Hot flashes          PHYSICAL EXAMINATION    Visit Vitals    /60    Pulse 66    Ht 5' 10\" (1.778 m)    Wt 238 lb (108 kg)    BMI 34.15 kg/m2       CONSTITUTIONAL: NAD, A&O x 3  SENSATION: Intact to light touch throughout  RANGE OF MOTION: The patient has full passive range of motion in all four extremities. MOTOR:  Straight Leg Raise: Negative, bilateral               Hip Flex Knee Ext Knee Flex Ankle DF GTE Ankle PF Tone   Right +4/5 +4/5 +4/5 +4/5 +4/5 +4/5 +4/5   Left +4/5 +4/5 +4/5 +4/5 +4/5 +4/5 +4/5       ASSESSMENT   Diagnoses and all orders for this visit:    1. Spinal stenosis, lumbar region, without neurogenic claudication    2.  Lumbar postlaminectomy syndrome    3. Other intervertebral disc displacement, lumbar region    Other orders  -     DULoxetine (CYMBALTA) 60 mg capsule; Take 1 Cap by mouth daily. IMPRESSION AND PLAN:  Patient wished to continue his current treatment. He was provided with refills of his Cymbalta 60 mg per day. I will see the patient back in 6 month's time or earlier if needed. Written by Concepción Khalil, as dictated by Lianna Meyer MD  I examined the patient, reviewed and agree with the note.

## 2018-05-08 ENCOUNTER — OFFICE VISIT (OUTPATIENT)
Dept: ORTHOPEDIC SURGERY | Age: 63
End: 2018-05-08

## 2018-05-08 VITALS
DIASTOLIC BLOOD PRESSURE: 82 MMHG | HEART RATE: 73 BPM | WEIGHT: 241 LBS | BODY MASS INDEX: 34.5 KG/M2 | SYSTOLIC BLOOD PRESSURE: 151 MMHG | HEIGHT: 70 IN

## 2018-05-08 DIAGNOSIS — M48.062 SPINAL STENOSIS OF LUMBAR REGION WITH NEUROGENIC CLAUDICATION: ICD-10-CM

## 2018-05-08 DIAGNOSIS — M51.26 OTHER INTERVERTEBRAL DISC DISPLACEMENT, LUMBAR REGION: ICD-10-CM

## 2018-05-08 DIAGNOSIS — M96.1 LUMBAR POSTLAMINECTOMY SYNDROME: Primary | ICD-10-CM

## 2018-05-08 RX ORDER — TRIAMCINOLONE ACETONIDE 1 MG/G
CREAM TOPICAL
Refills: 0 | COMMUNITY
Start: 2018-04-23 | End: 2018-05-08 | Stop reason: ALTCHOICE

## 2018-05-08 RX ORDER — SILDENAFIL CITRATE 20 MG/1
20 TABLET ORAL 3 TIMES DAILY
COMMUNITY

## 2018-05-08 RX ORDER — DULOXETIN HYDROCHLORIDE 60 MG/1
60 CAPSULE, DELAYED RELEASE ORAL DAILY
Qty: 90 CAP | Refills: 1 | Status: SHIPPED | OUTPATIENT
Start: 2018-05-08 | End: 2018-08-13 | Stop reason: SDUPTHER

## 2018-05-08 RX ORDER — POLYETHYLENE GLYCOL 3350 17 G/17G
17 POWDER, FOR SOLUTION ORAL DAILY
COMMUNITY

## 2018-05-08 RX ORDER — OMEPRAZOLE 40 MG/1
40 CAPSULE, DELAYED RELEASE ORAL DAILY
COMMUNITY
Start: 2018-05-07

## 2018-05-08 RX ORDER — NITROGLYCERIN 0.4 MG/1
0.4 TABLET SUBLINGUAL
COMMUNITY

## 2018-05-08 NOTE — PROGRESS NOTES
[facility]  [address], [place]   Phone: [phone]  Fax: [phone]        PROGRESS NOTE      HISTORY OF PRESENT ILLNESS:  The patient is a 58 y.o. male and was seen today for follow up of centralized low back pain. Pt denies radicular symptoms at this time which were noted to extend into the  RLE in an S1 distribution to the ankle. He describes pain as a burning sensation. Pt continues to endorse left anterior thigh numbness. The patient was diagnosed with lumbar postlaminectomy syndrome. Symptoms exacerbated with activity. He reports intolerance to NEURONTIN. Pt is compliant with Cymbalta 60 mg daily. He continues with Tramadol prn without relief. Pt completed Medrol Dosepak with slight relief. Pt has h/o myocardial infarction x 3. He underwent cardiac stent placement recently at [facility] by Dr. Robbin Jara in 1/2017. He is on Plavix. Pt is no longer being followed by Dr. Robbin Jara. Pt denies hx of glaucoma. Pt reports of chronic bowel incontinence since his bowel resection from one year ago and chronic bladder incontinence ~ x 10 years. He states he was being evaluated in [place] for possible lung cancer which was ruled out and was dx with asbestosis. Pt reports of chronic suicidal ideation but has no plans on harming himself at this time. His PCP was Dr. Horton Bamberger but has stopped seeing him as he lives in [place]. Pt is not actively being followed by a psychiatrist. Pt is on Plavix which per patient is likely chronic. He is seeing a new cardiologist (Dr. Igor Lyon). Pt is not a candidate for MRI at this time due to cardiac stent placement. He denies h/o glaucoma. Lumbar spine MRI per report revealed developmentally small spinal canal and developmentally small foramina with superimposed degenerative facet disease. There were no focal disc protrusions or significant spinal canal stenosis.  There were multiple levels of foraminal stenosis, partially developmental and partially due to degenerative facet changes. It was most marked at L3-L4 and L4-L5 and to a lesser degree at L2-L3. At his last clinic appointment, patient wished to continue his current treatment. He was provided with refills of his Cymbalta 60 mg per day. The patient returns today with pain location and distribution remain unchanged. He rates pain 2/10, a slight decrease since his last visit (3/10). Pt is compliant with Cymbalta 60 mg per day.  reviewed. Body mass index is 34.58 kg/(m^2). PCP: Ning Bland MD      Past Medical History:   Diagnosis Date    Aortic aneurysm, abdominal ([address]     Back pain     Diverticulitis     Hearing loss     Heart disease     ischemic    Hernia of unspecified site of abdominal cavity without mention of obstruction or gangrene     History of myocardial perfusion scan 08/30/2013    Med-sized, fixed inferior defect most likely artifact rather than prior infarction. No ischemia. No WMA. EF 55%.  Holter monitor, normal 09/09/2013    Normal Holter w/5 PVCs. No symptoms reported.  Hypercholesterolemia     Hyperlipidemia     Hypertension     Hypertriglyceridemia     Joint pain     Low back pain radiating to both legs     Lumbar postlaminectomy syndrome     Lung disease     Morbidly obese (HCC)     Osteoarthritis     S/P cardiac cath 03/09/1994    pRCA 100%. Otherwise patent coronaries. LVEDP 20-25. EF 70%.  Sciatica     Sleep apnea     [place] injury due to [place]rapnel     [place]    Wears glasses         Social History     Social History    Marital status:      Spouse name: N/A    Number of children: N/A    Years of education: N/A     Occupational History    Not on file.      Social History Main Topics    Smoking status: Former Smoker     Quit date: 1/1/2001    Smokeless tobacco: Never Used    Alcohol use Yes      Comment: occasional    Drug use: Not on file    Sexual activity: Not on file     Other Topics Concern    Not on file     Social History Narrative       Current Outpatient Prescriptions   Medication Sig Dispense Refill    omeprazole (PRILOSEC) 40 mg capsule       nitroglycerin (NITROSTAT) 0.4 mg SL tablet 0.4 mg by SubLINGual route every five (5) minutes as needed for Chest Pain. Up to 3 doses.  polyethylene glycol (MIRALAX) 17 gram packet Take 17 g by mouth daily.  sildenafil, antihypertensive, (REVATIO) 20 mg tablet Take 20 mg by mouth three (3) times daily.  DULoxetine (CYMBALTA) 60 mg capsule Take 1 Cap by mouth daily. 90 Cap 1    MULTIVIT-MINERALS/FA/LYCOPENE (ONE-A-DAY MEN'S PO) Take  by mouth.  DULoxetine (CYMBALTA) 60 mg capsule Take 1 Cap by mouth daily. 90 Cap 1    metoprolol tartrate (LOPRESSOR) 25 mg tablet Take 1 Tab by mouth two (2) times a day. 60 Tab 1    clopidogrel (PLAVIX) 75 mg tab Take 1 Tab by mouth daily. 30 Tab 1    tamsulosin (FLOMAX) 0.4 mg capsule       aspirin delayed-release 81 mg tablet Take 81 mg by mouth.  cpap machine kit Take  by inhalation nightly. Allergies   Allergen Reactions    Flu Vaccine 2011 (36 Mos+)(Pf) Anaphylaxis    Atorvastatin Other (comments)    Crestor [Rosuvastatin] Unknown (comments)     Intolerant; abdominal pain    Iodine Hives     IV dye    Ivp Dye [Fd And C Blue No.1] Swelling    Toradol [Ketorolac Tromethamine] Rash     Other reaction(s): Hives  Profuse sweating    Tricor [Fenofibrate Micronized] Other (comments)     Abdominal pain    Zetia [Ezetimibe] Unknown (comments)     Hot flashes          PHYSICAL EXAMINATION    Visit Vitals    /82    Pulse 73    Ht 5' 10\" (1.778 m)    Wt 109.3 kg (241 lb)    BMI 34.58 kg/m2       CONSTITUTIONAL: NAD, A&O x 3  SENSATION: Decreased sensation to light touch at left lateral thigh. Sensation to light touch otherwise intact. RANGE OF MOTION: The patient has full passive range of motion in all four extremities.   MOTOR:  Straight Leg Raise: Negative, bilateral               Hip Flex Knee Ext Knee Flex Ankle DF GTE Ankle PF Tone   Right +4/5 +4/5 +4/5 +4/5 +4/5 +4/5 +4/5   Left +4/5 +4/5 +4/5 +4/5 +4/5 +4/5 +4/5       ASSESSMENT   Diagnoses and all orders for this visit:    1. Lumbar postlaminectomy syndrome    2. Spinal stenosis of lumbar region with neurogenic claudication    3. Other intervertebral disc displacement, lumbar region    Other orders  -     DULoxetine (CYMBALTA) 60 mg capsule; Take 1 Cap by mouth daily. IMPRESSION AND PLAN:  Patient wished to continue his current treatment. He was provided with refills of his Cymbalta 60 mg. Patient will f/u with NP in 3 month's time. Written by Vijay Neely, as dictated by La Nena Amaya MD  I examined the patient, reviewed and agree with the note.

## 2018-05-08 NOTE — MR AVS SNAPSHOT
[address] [facility] 
 
 
 [address] [facility] 
[phone] Patient: Gui Infante MRN: [id number] KKM:[birth year] Visit Information Date & Time Provider Department Dept. Phone Encounter #  
 5/8/2018  9:05 AM Jono Ramos  [facility] [address] [facility] - SPECIALTY [facility] -[phone] [id number] Follow-up Instructions Return for f/u with NursePract in 3 months. Your Appointments 5/8/2018  9:05 AM  
Follow Up with Jono Ramos MD  
[address] [facility] [address] [facility] - SPECIALTY [facility] ([facility] [place] Appt Note: 6 mo follow up  lower back/wanted a month earlier for [facility]; pt r/s 04/09/18 appt [facility] [address]  
[address] [facility] Upcoming Health Maintenance Date Due Hepatitis C Screening 1955 DTaP/Tdap/Td series (1 - Tdap) 6/8/1976 FOBT Q 1 YEAR AGE 50-75 6/8/2005 ZOSTER VACCINE AGE 60> 4/8/2015 Influenza Age 5 to Adult 8/1/2018 Allergies as of 5/8/2018  Review Complete On: 5/8/2018 By: Jono Ramos MD  
  
 Severity Noted Reaction Type Reactions Flu Vaccine 2011 (36 Mos+)(pf) High 01/11/2017    Anaphylaxis Atorvastatin Medium 03/17/2017    Other (comments) Crestor [Rosuvastatin]    Unknown (comments) Intolerant; abdominal pain Iodine  02/10/2016    Hives IV dye Ivp Dye [Fd And C Blue No.1]    Swelling Toradol [Ketorolac Tromethamine]    Rash Other reaction(s): Hives Profuse sweating Tricor [Fenofibrate Micronized]  03/21/2011    Other (comments) Abdominal pain Zetia [Ezetimibe]    Unknown (comments) Hot flashes Current Immunizations  Never Reviewed No immunizations on file. Not reviewed this visit You Were Diagnosed With   
  
 Codes Comments Lumbar postlaminectomy syndrome    -  Primary ICD-10-CM: M96.1 ICD-9-CM: 722.83   
 Spinal stenosis of lumbar region with neurogenic claudication     ICD-10-CM: M48.062 
ICD-9-CM: 724.03 Other intervertebral disc displacement, lumbar region     ICD-10-CM: M51.26 
ICD-9-CM: 722.10 Vitals BP Pulse Height(growth percentile) Weight(growth percentile) BMI Smoking Status 151/82 73 5' 10\" (1.778 m) 241 lb (109.3 kg) 34.58 kg/m2 Former Smoker Vitals History BMI and BSA Data Body Mass Index Body Surface Area 34.58 kg/m 2 2.32 m 2 Preferred Pharmacy Pharmacy Name Phone [facility] [address] [facility] [address], [address] [place] [address] [phone] Your Updated Medication List  
  
   
This list is accurate as of 5/8/18  8:50 AM.  Always use your most recent med list.  
  
  
  
  
 aspirin delayed-release 81 mg tablet Take 81 mg by mouth. clopidogrel 75 mg Tab Commonly known as:  PLAVIX Take 1 Tab by mouth daily. cpap machine kit Take  by inhalation nightly. * DULoxetine 60 mg capsule Commonly known as:  CYMBALTA Take 1 Cap by mouth daily. * DULoxetine 60 mg capsule Commonly known as:  CYMBALTA Take 1 Cap by mouth daily. metoprolol tartrate 25 mg tablet Commonly known as:  LOPRESSOR Take 1 Tab by mouth two (2) times a day. MIRALAX 17 gram packet Generic drug:  polyethylene glycol Take 17 g by mouth daily. nitroglycerin 0.4 mg SL tablet Commonly known as:  NITROSTAT  
0.4 mg by SubLINGual route every five (5) minutes as needed for Chest Pain. Up to 3 doses. omeprazole 40 mg capsule Commonly known as:  PRILOSEC  
  
 ONE-A-DAY MEN'S PO Take  by mouth. REVATIO 20 mg tablet Generic drug:  sildenafil (antihypertensive) Take 20 mg by mouth three (3) times daily. tamsulosin 0.4 mg capsule Commonly known as:  FLOMAX * Notice:   This list has 2 medication(s) that are the same as other medications prescribed for you. Read the directions carefully, and ask your doctor or other care provider to review them with you. Prescriptions Sent to Pharmacy Refills DULoxetine (CYMBALTA) 60 mg capsule 1 Sig: Take 1 Cap by mouth daily. Class: Normal  
 Pharmacy: [facility] [address] [facility] [address], [address] [facility] [address] [place] [address] [facility]: [phone] Route: Oral  
  
Follow-up Instructions Return for f/u with NursePract in 3 months. Introducing [facility]! [employer] introduces [employer] patient portal. Now you can access parts of your medical record, email your doctor's office, and request medication refills online. 1. In your internet browser, go to [email or web address] "[email or web address] 2. Click on the First Time User? Click Here link in the Sign In box. You will see the New Member Sign Up page. 3. Enter your [employer] Access Code exactly as it appears below. You will not need to use this code after youve completed the sign-up process. If you do not sign up before the expiration date, you must request a new code. · [employer] Access Code: [email or web address] Expires: 8/5/2018 12:12 PM 
 
4. Enter the last four digits of your Social Security Number (xxxx) and Date of Birth (mm/dd/yyyy) as indicated and click Submit. You will be taken to the next sign-up page. 5. Create a [employer] ID. This will be your [employer] login ID and cannot be changed, so think of one that is secure and easy to remember. 6. Create a [employer] password. You can change your password at any time. 7. Enter your Password Reset Question and Answer. This can be used at a later time if you forget your password. 8. Enter your e-mail address. You will receive e-mail notification when new information is available in [address] 9. Click Sign Up. You can now view and download portions of your medical record. 10. Click the Download Summary menu link to download a portable copy of your medical information. If you have questions, please visit the Frequently Asked Questions section of the [employer] website. Remember, [employer] is NOT to be used for urgent needs. For medical emergencies, dial 911. Now available from your iPhone and Android! Please provide this summary of care documentation to your next provider. Your primary care clinician is listed as Cleveland Haddad. If you have any questions after today's visit, please call [phone].

## 2018-08-13 ENCOUNTER — OFFICE VISIT (OUTPATIENT)
Dept: ORTHOPEDIC SURGERY | Age: 63
End: 2018-08-13

## 2018-08-13 VITALS
BODY MASS INDEX: 33.1 KG/M2 | WEIGHT: 231.2 LBS | HEART RATE: 71 BPM | DIASTOLIC BLOOD PRESSURE: 83 MMHG | RESPIRATION RATE: 18 BRPM | HEIGHT: 70 IN | SYSTOLIC BLOOD PRESSURE: 146 MMHG

## 2018-08-13 DIAGNOSIS — M48.062 SPINAL STENOSIS OF LUMBAR REGION WITH NEUROGENIC CLAUDICATION: Primary | ICD-10-CM

## 2018-08-13 DIAGNOSIS — M96.1 POSTLAMINECTOMY SYNDROME, LUMBAR: ICD-10-CM

## 2018-08-13 DIAGNOSIS — M51.26 DISPLACEMENT OF LUMBAR INTERVERTEBRAL DISC WITHOUT MYELOPATHY: ICD-10-CM

## 2018-08-13 RX ORDER — DULOXETIN HYDROCHLORIDE 60 MG/1
60 CAPSULE, DELAYED RELEASE ORAL DAILY
Qty: 90 CAP | Refills: 1 | Status: SHIPPED | OUTPATIENT
Start: 2018-08-13 | End: 2019-01-07 | Stop reason: SDUPTHER

## 2018-08-13 RX ORDER — ATORVASTATIN CALCIUM 80 MG/1
80 TABLET, FILM COATED ORAL DAILY
COMMUNITY
Start: 2018-08-06

## 2018-08-13 NOTE — PROGRESS NOTES
[address], [address]  [place], [address] [place] [address] Street  Phone: [phone]  Fax: [phone]    Kasey Smith  : 1955  PCP: Mary Guadalupe MD    PROGRESS NOTE    HISTORY OF PRESENT ILLNESS:  Chief Complaint   Patient presents with    Back Pain     3 MONTH FOLLOW UP AND MED REFILL    Leg Pain     BLE     Ruthie Daigle is a 61 y.o.  male with history of centralized low back pain. Pt denies radicular symptoms at this time which were noted to extend into the  RLE in an S1 distribution to the ankle. The patient was diagnosed with lumbar postlaminectomy syndrome. Symptoms exacerbated with activity. He reports intolerance to NEURONTIN. Pt is compliant with Cymbalta 60 mg daily. Pt has h/o myocardial infarction x 3. He underwent cardiac stent placement recently at [facility] by Dr. Sisi George in 2017. He is on Plavix. Pt is no longer being followed by Dr. Amarilis Artis denies hx of glaucoma. Pt reports of chronic bowel incontinence since his bowel resection from one year ago and chronic bladder incontinence ~ x 10 years. Pt is not a candidate for MRI at this time due to cardiac stent placement. He has taken Tramadol in the past without benefit. He was last seen with Dr. Lynne Hurt and was to continue Cymbalta 60 mg. Today, his back is doing about the same. It continues to hurt, the pain changes from day to day. He states he is taking old Tramadol. He states this does help some when he has to lay down in bed. He only takes it maybe 3 times a month. The pain will subside if he does not take it. He has lumbar pain and occasional right leg pain when it radiates. He rides a stationary bike daily. Denies bladder/bowel dysfunction, saddle paresthesia, weakness, gait disturbance, or other neurological deficit. Pt at this time desires to continue with current care/proceed with medication evaluation.     Lumbar spine MRI: per previous note/report revealed developmentally small spinal canal and developmentally small foramina with superimposed degenerative facet disease. There were no focal disc protrusions or significant spinal canal stenosis. There were multiple levels of foraminal stenosis, partially developmental and partially due to degenerative facet changes. It was most marked at L3-L4 and L4-L5 and to a lesser degree at L2-L3. ASSESSMENT  61 y.o. male with lumbar pain. Diagnoses and all orders for this visit:    1. Spinal stenosis of lumbar region with neurogenic claudication  -     DULoxetine (CYMBALTA) 60 mg capsule; Take 1 Cap by mouth daily. 2. Postlaminectomy syndrome, lumbar  -     DULoxetine (CYMBALTA) 60 mg capsule; Take 1 Cap by mouth daily. IMPRESSION/PLAN    1) Pt was given information on lumbar exercises. 2) Refill of Cymbalta 60 mg daily. 3) He can see if his PCP will prescribe this, if so, released from specialty care. 4) Mr. Piper Sims has a reminder for a \"due or due soon\" health maintenance. I have asked that he contact his primary care provider, Markie Reyes MD, for follow-up on this health maintenance. 5) We have informed patient to notify us for immediate appointment if he has any worsening neurogical symptoms or if an emergency situation presents, then call 911  6) Pt will follow-up in 6 months if his PCP does not take over prescribing. PAST MEDICAL HISTORY  Past Medical History:   Diagnosis Date    Aortic aneurysm, abdominal (HCC)     Back pain     Diverticulitis     Hearing loss     Heart disease     ischemic    Hernia of unspecified site of abdominal cavity without mention of obstruction or gangrene     History of myocardial perfusion scan 08/30/2013    Med-sized, fixed inferior defect most likely artifact rather than prior infarction. No ischemia. No WMA. EF 55%.  Holter monitor, normal 09/09/2013    Normal Holter w/5 PVCs. No symptoms reported.     Hypercholesterolemia     Hyperlipidemia  Hypertension     Hypertriglyceridemia     Joint pain     Low back pain radiating to both legs     Lumbar postlaminectomy syndrome     Lung disease     Morbidly obese (HCC)     Osteoarthritis     S/P cardiac cath 03/09/1994    pRCA 100%. Otherwise patent coronaries. LVEDP 20-25. EF 70%.  Sciatica     Sleep apnea     [place] injury due to [place]    Wears glasses         MEDICATIONS  Current Outpatient Prescriptions   Medication Sig Dispense Refill    atorvastatin (LIPITOR) 20 mg tablet       DULoxetine (CYMBALTA) 60 mg capsule Take 1 Cap by mouth daily. 90 Cap 1    sildenafil, antihypertensive, (REVATIO) 20 mg tablet Take 20 mg by mouth three (3) times daily.  MULTIVIT-MINERALS/FA/LYCOPENE (ONE-A-DAY MEN'S PO) Take  by mouth.  metoprolol tartrate (LOPRESSOR) 25 mg tablet Take 1 Tab by mouth two (2) times a day. 60 Tab 1    clopidogrel (PLAVIX) 75 mg tab Take 1 Tab by mouth daily. 30 Tab 1    tamsulosin (FLOMAX) 0.4 mg capsule       aspirin delayed-release 81 mg tablet Take 81 mg by mouth.  cpap machine kit Take  by inhalation nightly.  omeprazole (PRILOSEC) 40 mg capsule       nitroglycerin (NITROSTAT) 0.4 mg SL tablet 0.4 mg by SubLINGual route every five (5) minutes as needed for Chest Pain. Up to 3 doses.  polyethylene glycol (MIRALAX) 17 gram packet Take 17 g by mouth daily.          ALLERGIES  Allergies   Allergen Reactions    Flu Vaccine 2011 (36 Mos+)(Pf) Anaphylaxis    Atorvastatin Other (comments)    Crestor [Rosuvastatin] Unknown (comments)     Intolerant; abdominal pain    Iodine Hives     IV dye    Ivp Dye [Fd And C Blue No.1] Swelling    Toradol [Ketorolac Tromethamine] Rash     Other reaction(s): Hives  Profuse sweating    Tricor [Fenofibrate Micronized] Other (comments)     Abdominal pain    Zetia [Ezetimibe] Unknown (comments)     Hot flashes       SOCIAL HISTORY    Social History     Social History    Marital status:  Spouse name: N/A    Number of children: N/A    Years of education: N/A     Occupational History    Not on file. Social History Main Topics    Smoking status: Former Smoker     Quit date: 1/1/2001    Smokeless tobacco: Never Used    Alcohol use Yes      Comment: occasional    Drug use: Not on file    Sexual activity: Not on file     Other Topics Concern    Not on file     Social History Narrative       SUBJECTIVE      Pain Scale: 4/10    Pain Assessment  8/13/2018   Location of Pain Back;Buttocks;Leg   Location Modifiers Right;Left   Severity of Pain 4   Quality of Pain Sharp   Quality of Pain Comment NUMBNESS   Duration of Pain Persistent   Frequency of Pain Constant   Aggravating Factors Other (Comment)   Aggravating Factors Comment SITTING   Limiting Behavior Some   Relieving Factors Other (Comment)   Result of Injury Yes   Work-Related Injury Yes   Type of Injury -   Type of Injury Comment -       Accompanied by self. REVIEW OF SYSTEMS  ROS    Constitutional: Negative for fever, chills, or weight change. Respiratory: Negative for cough or shortness of breath. Cardiovascular: Negative for chest pain or palpitations. Gastrointestinal: Negative for acid reflux, change in bowel habits, or constipation. Genitourinary: Negative for incontinence, dysuria and flank pain. Musculoskeletal: Positive for lumbar pain. Skin: Negative for rash. Neurological: Negative for headaches, dizziness, or numbness. Endo/Heme/Allergies: Negative . Psychiatric/Behavioral: Negative. PHYSICAL EXAMINATION  Visit Vitals    /83    Pulse 71    Resp 18    Ht 5' 10\" (1.778 m)    Wt 231 lb 3.2 oz (104.9 kg)    BMI 33.17 kg/m2       Constitutional: Well developed,  well nourished,  awake, alert, and in no acute distress. Neurological:  Sensation to light touch is intact. Psychiatric: Affect and mood are appropriate.    Integumentary: No rashes or abrasions noted on exposed areas,  warm, dry and intact. Cardiovascular/Peripheral Vascular:  No peripheral edema is noted. Lymphatic:  No evidence of lymphedema. No cervical lymphadenopathy. SPINE/MUSCULOSKELETAL EXAM    Lumbar spine:  No rash, ecchymosis, or gross obliquity. No fasciculations. No focal atrophy is noted. Range of motion is intact. No Tenderness to palpation . SI joints non-tender. Trochanters non tender. Musculoskeletal:  No pain with extension, axial loading, or forward flexion. No pain with internal or external rotation of his hips. MOTOR     Hip Flex  Quads Hamstrings Ankle DF EHL Ankle PF   Right +4/5 +4/5 +4/5 +4/5 +4/5 +4/5   Left +4/5 +4/5 +4/5 +4/5 +4/5 +4/5       Straight Leg raise negative bilaterally. normal gait and station    Ambulation without assistive device. full weight bearing, non-antalgic gait.     Luis Humphrey, [place]

## 2018-11-19 NOTE — PROCEDURE NOTE: SURGICAL
<p>Prior to commencing surgery patient identification, surgical procedure, site, and side were confirmed by Dr. Marlen Washington. Following topical proparacaine anesthesia, the patient was positioned at the YAG laser, a contact lens coupled to the cornea with methylcellulose and an axial posterior capsulotomy performed without complication using 3.4 Mj x 18. Excess methylcellulose was washed from the eye, one drop of Alphagan was instilled and the patient returned to the holding area having tolerated the procedure well and without complication. </p><p>MRN [id number]</p>

## 2018-11-27 ENCOUNTER — TELEPHONE (OUTPATIENT)
Dept: ORTHOPEDIC SURGERY | Age: 63
End: 2018-11-27

## 2018-11-27 NOTE — TELEPHONE ENCOUNTER
Patient called stating his PCP Dr. Loan Bella at [address] is having an issue with the [address] [facility] [employer] of [employer]. He states Dr. Silverio Avalos office is supposed to call them and give them a \"6 to 8 digit number\" that they need to process payments for this patient. (?) He is requesting we call [facility] at [phone]. He also left a call back number of [phone].

## 2018-11-28 NOTE — TELEPHONE ENCOUNTER
Called and spoke to Yusef at [facility] and she states that she spoke to Adella Paget yesterday and Adella Paget is going to call the patient and see what he needs. Closing message at this time.

## 2019-01-07 ENCOUNTER — OFFICE VISIT (OUTPATIENT)
Dept: ORTHOPEDIC SURGERY | Age: 64
End: 2019-01-07

## 2019-01-07 VITALS
DIASTOLIC BLOOD PRESSURE: 75 MMHG | TEMPERATURE: 98.8 F | OXYGEN SATURATION: 97 % | BODY MASS INDEX: 33.33 KG/M2 | SYSTOLIC BLOOD PRESSURE: 133 MMHG | WEIGHT: 232.8 LBS | RESPIRATION RATE: 16 BRPM | HEART RATE: 68 BPM | HEIGHT: 70 IN

## 2019-01-07 DIAGNOSIS — M48.062 SPINAL STENOSIS OF LUMBAR REGION WITH NEUROGENIC CLAUDICATION: ICD-10-CM

## 2019-01-07 DIAGNOSIS — M96.1 POSTLAMINECTOMY SYNDROME, LUMBAR: ICD-10-CM

## 2019-01-07 RX ORDER — PANTOPRAZOLE SODIUM 40 MG/1
40 TABLET, DELAYED RELEASE ORAL DAILY
COMMUNITY

## 2019-01-07 RX ORDER — DULOXETIN HYDROCHLORIDE 60 MG/1
60 CAPSULE, DELAYED RELEASE ORAL DAILY
Qty: 90 CAP | Refills: 1 | Status: SHIPPED | OUTPATIENT
Start: 2019-01-07 | End: 2019-06-24 | Stop reason: SDUPTHER

## 2019-01-07 RX ORDER — RANITIDINE 150 MG/1
150 TABLET, FILM COATED ORAL
COMMUNITY
Start: 2018-09-18

## 2019-01-07 RX ORDER — ATORVASTATIN CALCIUM 80 MG/1
80 TABLET, FILM COATED ORAL
COMMUNITY
Start: 2018-12-14 | End: 2019-11-21 | Stop reason: SDUPTHER

## 2019-01-07 RX ORDER — SUCRALFATE 1 G/1
1 TABLET ORAL
COMMUNITY
Start: 2018-09-18

## 2019-01-07 NOTE — PATIENT INSTRUCTIONS

## 2019-01-07 NOTE — PROGRESS NOTES
[address], [address]  [place], [facility] [address]  Phone: [phone]  Fax: [phone]    Nanda Nicholas  : 1955  PCP: Rupert Dobbs MD    PROGRESS NOTE    HISTORY OF PRESENT ILLNESS:  Chief Complaint   Patient presents with    Back Pain     5 month follow up, WC    Leg Pain     BLE       Hazel Second is a 61 y.o.  male with history of centralized low back pain. Pt denies radicular symptoms at this time which were noted to extend into the  RLE in an S1 distribution to the ankle.  The patient was diagnosed with lumbar postlaminectomy syndrome. Symptoms exacerbated with activity. He reports intolerance to NEURONTIN. Pt is compliant with Cymbalta 60 mg daily. Pt has h/o myocardial infarction x 3. He underwent cardiac stent placement recently at [facility] by Dr. Augustine Guerra in 2017. He is on Plavix. Pt is no longer being followed by Dr. Garcia Prom denies hx of glaucoma. Pt reports of chronic bowel incontinence since his bowel resection from one year ago and chronic bladder incontinence ~ x 10 years. Pt is not a candidate for MRI at this time due to cardiac stent placement. He has taken Tramadol in the past without benefit. He was using an old rx and took it maybe 3 times a month. He was last seen and was to continue Cymbalta 60 mg. He was in the [employer] for years and did a lot of parachute jumps. He is trying to find a practice in [place] to manage him as the drive is far. He is waiting to hear back from [facility].     Today, he has good and bad days. When he has pain, it is a lumbar pain R>L and will radiate down his right posterior thigh and some left anterior thigh numbness that is improving some. His right thigh is feeling some numb. He is riding his bicycle in front of the TV each morning. Denies bladder/bowel dysfunction, saddle paresthesia, weakness, gait disturbance, or other neurological deficit.  Pt at this time desires to continue with current care/proceed with medication evaluation. He does admit to taking an old tramadol 1 every 4 months with a beer when his pain is so severe. I would not prescribe further tramadol due to this. We discussed this is not safe. He verbalized understanding.      Lumbar spine MRI: per previous note/report revealed developmentally small spinal canal and developmentally small foramina with superimposed degenerative facet disease. There were no focal disc protrusions or significant spinal canal stenosis. There were multiple levels of foraminal stenosis, partially developmental and partially due to degenerative facet changes. It was most marked at L3-L4 and L4-L5 and to a lesser degree at L2-L3      ASSESSMENT  61 y.o. male with lumbar pain. Diagnoses and all orders for this visit:    1. Spinal stenosis of lumbar region with neurogenic claudication  -     DULoxetine (CYMBALTA) 60 mg capsule; Take 1 Cap by mouth daily. 2. Postlaminectomy syndrome, lumbar  -     DULoxetine (CYMBALTA) 60 mg capsule; Take 1 Cap by mouth daily. IMPRESSION/PLAN    1) Pt was given information on lumbar exercises. 2) Continue Cymbalta 60 mg.   3) Continue HEP. 4) Mr. Cesar Renee has a reminder for a \"due or due soon\" health maintenance. I have asked that he contact his primary care provider, Dinh Chavez MD, for follow-up on this health maintenance. 5) We have informed patient to notify us for immediate appointment if he has any worsening neurogical symptoms or if an emergency situation presents, then call 911  6) Pt will follow-up in 5 months for med fu. Risks and benefits of ongoing therapy have been reviewed with the patient.  is appropriate.        PAST MEDICAL HISTORY  Past Medical History:   Diagnosis Date    Aortic aneurysm, abdominal (HCC)     Back pain     Diverticulitis     Hearing loss     Heart disease     ischemic    Hernia of unspecified site of abdominal cavity without mention of obstruction or gangrene     History of myocardial perfusion scan 08/30/2013    Med-sized, fixed inferior defect most likely artifact rather than prior infarction. No ischemia. No WMA. EF 55%.  Holter monitor, normal 09/09/2013    Normal Holter w/5 PVCs. No symptoms reported.  Hypercholesterolemia     Hyperlipidemia     Hypertension     Hypertriglyceridemia     Joint pain     Low back pain radiating to both legs     Lumbar postlaminectomy syndrome     Lung disease     Morbidly obese (HCC)     Multiple gastric ulcers     x 3    Osteoarthritis     S/P cardiac cath 03/09/1994    pRCA 100%. Otherwise patent coronaries. LVEDP 20-25. EF 70%.  Sciatica     Sleep apnea     War injury due to [place]     [employer]    Wears glasses         MEDICATIONS  Current Outpatient Medications   Medication Sig Dispense Refill    raNITIdine (ZANTAC) 150 mg tablet Take 150 mg by mouth.  sucralfate (CARAFATE) 1 gram tablet Take 1 g by mouth.  atorvastatin (LIPITOR) 80 mg tablet Take 80 mg by mouth.  DULoxetine (CYMBALTA) 60 mg capsule Take 1 Cap by mouth daily. 90 Cap 1    atorvastatin (LIPITOR) 20 mg tablet 80 mg.      metoprolol tartrate (LOPRESSOR) 25 mg tablet Take 1 Tab by mouth two (2) times a day. 60 Tab 1    clopidogrel (PLAVIX) 75 mg tab Take 1 Tab by mouth daily. 30 Tab 1    tamsulosin (FLOMAX) 0.4 mg capsule       cpap machine kit Take  by inhalation nightly.  pantoprazole (PROTONIX) 40 mg tablet Take 40 mg by mouth.  omeprazole (PRILOSEC) 40 mg capsule       nitroglycerin (NITROSTAT) 0.4 mg SL tablet 0.4 mg by SubLINGual route every five (5) minutes as needed for Chest Pain. Up to 3 doses.  polyethylene glycol (MIRALAX) 17 gram packet Take 17 g by mouth daily.  sildenafil, antihypertensive, (REVATIO) 20 mg tablet Take 20 mg by mouth three (3) times daily.  MULTIVIT-MINERALS/FA/LYCOPENE (ONE-A-DAY MEN'S PO) Take  by mouth.       aspirin delayed-release 81 mg tablet Take 81 mg by mouth.         ALLERGIES  Allergies   Allergen Reactions    Flu Vaccine  (36 Mos+)(Pf) Anaphylaxis    Iodinated Contrast- Oral And Iv Dye Anaphylaxis    Atorvastatin Other (comments)    Crestor [Rosuvastatin] Unknown (comments)     Intolerant; abdominal pain    Iodine Hives     IV dye    Ivp Dye [Fd And C Blue No.1] Swelling    Toradol [Ketorolac Tromethamine] Rash     Other reaction(s): Hives  Profuse sweating    Tricor [Fenofibrate Micronized] Other (comments)     Abdominal pain    Zetia [Ezetimibe] Unknown (comments)     Hot flashes       SOCIAL HISTORY    Social History     Socioeconomic History    Marital status:      Spouse name: Not on file    Number of children: Not on file    Years of education: Not on file    Highest education level: Not on file   Social Needs    Financial resource strain: Not on file    Food insecurity - worry: Not on file    Food insecurity - inability: Not on file    Transportation needs - medical: Not on file   [place] needs - non-medical: Not on file   Occupational History    Not on file   Tobacco Use    Smoking status: Former Smoker     Last attempt to quit: 2001     Years since quittin.0    Smokeless tobacco: Never Used   Substance and Sexual Activity    Alcohol use: Yes     Comment: occasional    Drug use: No    Sexual activity: Not on file   Other Topics Concern     Service Not Asked    Blood Transfusions Not Asked    Caffeine Concern Not Asked    Occupational Exposure Not Asked    Hobby Hazards Not Asked    Sleep Concern Not Asked    Stress Concern Not Asked    Weight Concern Not Asked    Special Diet Not Asked    Back Care Not Asked    Exercise Not Asked    Bike Helmet Not Asked    Seat Belt Not Asked    Self-Exams Not Asked   Social History Narrative    Not on file       SUBJECTIVE      Pain Scale: 4/10    Pain Assessment  2019   Location of Pain Back;Leg   Location Modifiers Right;Left   Severity of Pain 4   Quality of Pain Aching   Quality of Pain Comment -   Duration of Pain Persistent   Frequency of Pain Constant   Aggravating Factors Other (Comment)   Aggravating Factors Comment prolonged sitting, pushing   Limiting Behavior Some   Relieving Factors Other (Comment); Rest   Result of Injury Yes   Work-Related Injury Yes   Type of Injury -   Type of Injury Comment -       Accompanied by self. REVIEW OF SYSTEMS  ROS    Constitutional: Negative for fever, chills, or weight change. Respiratory: Negative for cough or shortness of breath. Cardiovascular: Negative for chest pain or palpitations. Gastrointestinal: Negative for acid reflux, change in bowel habits, or constipation. Genitourinary: Negative for incontinence, dysuria and flank pain. Musculoskeletal: Positive for lumbar pain. Skin: Negative for rash. Neurological: Negative for headaches, dizziness, or numbness. Endo/Heme/Allergies: Negative . Psychiatric/Behavioral: Negative. PHYSICAL EXAMINATION  Visit Vitals  /75   Pulse 68   Temp 98.8 °F (37.1 °C) (Oral)   Resp 16   Ht 5' 10\" (1.778 m)   Wt 232 lb 12.8 oz (105.6 kg)   SpO2 97%   BMI 33.40 kg/m²       Constitutional: Well developed,  well nourished,  awake, alert, and in no acute distress. Neurological:  Sensation to light touch is intact. Psychiatric: Affect and mood are appropriate. Integumentary: No rashes or abrasions noted on exposed areas,  warm, dry and intact. Cardiovascular/Peripheral Vascular:  No peripheral edema is noted. Lymphatic:  No evidence of lymphedema. No cervical lymphadenopathy. SPINE/MUSCULOSKELETAL EXAM  Lumbar spine:  No rash, ecchymosis, or gross obliquity. No fasciculations. No focal atrophy is noted. Range of motion is intact. No Tenderness to palpation . SI joints non-tender. Trochanters non tender.       Musculoskeletal:  No pain with extension, axial loading, or forward flexion. No pain with internal or external rotation of his hips.    MOTOR       Hip Flex  Quads Hamstrings Ankle DF EHL Ankle PF   Right +4/5 +4/5 +4/5 +4/5 +4/5 +4/5   Left +4/5 +4/5 +4/5 +4/5 +4/5 +4/5         Straight Leg raise negative bilaterally.      Normal gait and station     Ambulation without assistive device. full weight bearing, non-antalgic gait.       Edrick Canavan, NP

## 2019-06-24 ENCOUNTER — OFFICE VISIT (OUTPATIENT)
Dept: ORTHOPEDIC SURGERY | Age: 64
End: 2019-06-24

## 2019-06-24 VITALS
HEIGHT: 70 IN | WEIGHT: 229 LBS | HEART RATE: 78 BPM | SYSTOLIC BLOOD PRESSURE: 127 MMHG | DIASTOLIC BLOOD PRESSURE: 74 MMHG | RESPIRATION RATE: 24 BRPM | TEMPERATURE: 97.4 F | BODY MASS INDEX: 32.78 KG/M2 | OXYGEN SATURATION: 99 %

## 2019-06-24 DIAGNOSIS — M48.062 SPINAL STENOSIS OF LUMBAR REGION WITH NEUROGENIC CLAUDICATION: ICD-10-CM

## 2019-06-24 DIAGNOSIS — M96.1 POSTLAMINECTOMY SYNDROME, LUMBAR: ICD-10-CM

## 2019-06-24 RX ORDER — DULOXETIN HYDROCHLORIDE 60 MG/1
60 CAPSULE, DELAYED RELEASE ORAL DAILY
Qty: 90 CAP | Refills: 1 | Status: SHIPPED | OUTPATIENT
Start: 2019-06-24 | End: 2019-11-21 | Stop reason: SDUPTHER

## 2019-06-24 NOTE — PROGRESS NOTES
[address], [address]  [place], [address]  Phone: [phone]  Fax: [phone]    Segun Jennings  : 1955  PCP: Silvia Toney MD    PROGRESS NOTE    HISTORY OF PRESENT ILLNESS:  Chief Complaint   Patient presents with    Back Pain     low back pain    Follow-up    Work Related Injury       Riky Poe is a 61 y.o.  male with history of centralized low back pain. Pt denies radicular symptoms at this time which were noted to extend into the  RLE in an S1 distribution to the ankle.  The patient was diagnosed with lumbar postlaminectomy syndrome. Symptoms exacerbated with activity. He reports intolerance to NEURONTIN. Pt is compliant with Cymbalta 60 mg daily.  Pt has h/o myocardial infarction x 3. He underwent cardiac stent placement recently at [place] by Dr. Cain Srivastava in 2017. He is on Plavix. Pt is no longer being followed by Dr. Nicolas Velez denies hx of glaucoma. Pt reports of chronic bowel incontinence since his bowel resection from one year ago and chronic bladder incontinence ~ x 10 years. Pt is not a candidate for MRI at this time due to cardiac stent placement. He has taken Tramadol in the past without benefit. He was using an old rx and took it maybe 3 times a month. He was last seen and was to continue Cymbalta 60 mg. He was in the [employer] for years and did a lot of parachute jumps. He is trying to find a practice in [place] to manage him as the drive is far. He is waiting to hear back from [facility]. At his last visit, he stated he had good  And bad days. When he has pain, it is a lumbar pain R>L and will radiate down his right posterior thigh and some left anterior thigh numbness that is improving some. His right thigh is feeling some numb. He is riding his bicycle in front of the TV each morning. He was to continue Cymbalta 60 mg. Today, he states the Cymbalta is working to help keep him calm. He states he was in [place] for 25 years. Denies bladder/bowel dysfunction, saddle paresthesia, weakness, gait disturbance, or other neurological deficit. Pt at this time desires to continue with current care/proceed with medication evaluation. He does admit to taking an old tramadol 1 every 4 months with a beer when his pain is so severe. I would not prescribe further tramadol due to this. We discussed this is not safe. He verbalized understanding.      Lumbar spine MRI: per previous note/report revealed developmentally small spinal canal and developmentally small foramina with superimposed degenerative facet disease. There were no focal disc protrusions or significant spinal canal stenosis. There were multiple levels of foraminal stenosis, partially developmental and partially due to degenerative facet changes. It was most marked at L3-L4 and L4-L5 and to a lesser degree at L2-L3         ASSESSMENT  59 y.o. male with lumbar pain. Diagnoses and all orders for this visit:    1. Spinal stenosis of lumbar region with neurogenic claudication  -     DULoxetine (CYMBALTA) 60 mg capsule; Take 1 Cap by mouth daily. Indications: Neuropathic Pain    2. Postlaminectomy syndrome, lumbar  -     DULoxetine (CYMBALTA) 60 mg capsule; Take 1 Cap by mouth daily. Indications: Neuropathic Pain         IMPRESSION/PLAN    1) Pt was given information on lumbar exercises. 2) cont Cymbalta 60 mg daily. 3) Discussed PM would be his option for pain medication but he states this constipates him. 4) Mr. Radha Osborne has a reminder for a \"due or due soon\" health maintenance. I have asked that he contact his primary care provider, Kwabena Sullivan MD, for follow-up on this health maintenance. 5) We have informed patient to notify us for immediate appointment if he has any worsening neurogical symptoms or if an emergency situation presents, then call 911  6) Pt will follow-up in 5 months. He is WC so he has to be seen every 6 months.     Risks and benefits of ongoing therapy have been reviewed with the patient.  is appropriate. PAST MEDICAL HISTORY  Past Medical History:   Diagnosis Date    Aortic aneurysm, abdominal (HCC)     Back pain     Diverticulitis     Hearing loss     Heart disease     ischemic    Hernia of unspecified site of abdominal cavity without mention of obstruction or gangrene     History of myocardial perfusion scan 08/30/2013    Med-sized, fixed inferior defect most likely artifact rather than prior infarction. No ischemia. No WMA. EF 55%.  Holter monitor, normal 09/09/2013    Normal Holter w/5 PVCs. No symptoms reported.  Hypercholesterolemia     Hyperlipidemia     Hypertension     Hypertriglyceridemia     Joint pain     Low back pain radiating to both legs     Lumbar postlaminectomy syndrome     Lung disease     Morbidly obese (HCC)     Multiple gastric ulcers     x 3    Osteoarthritis     S/P cardiac cath 03/09/1994    pRCA 100%. Otherwise patent coronaries. LVEDP 20-25. EF 70%.  Sciatica     Sleep apnea     [place] injury due to [place]nel     [place]    Wears glasses         MEDICATIONS  Current Outpatient Medications   Medication Sig Dispense Refill    DULoxetine (CYMBALTA) 60 mg capsule Take 1 Cap by mouth daily. Indications: Neuropathic Pain 90 Cap 1    pantoprazole (PROTONIX) 40 mg tablet Take 40 mg by mouth.  raNITIdine (ZANTAC) 150 mg tablet Take 150 mg by mouth.  sucralfate (CARAFATE) 1 gram tablet Take 1 g by mouth.  atorvastatin (LIPITOR) 80 mg tablet Take 80 mg by mouth.  atorvastatin (LIPITOR) 20 mg tablet 80 mg.      omeprazole (PRILOSEC) 40 mg capsule       nitroglycerin (NITROSTAT) 0.4 mg SL tablet 0.4 mg by SubLINGual route every five (5) minutes as needed for Chest Pain. Up to 3 doses.  sildenafil, antihypertensive, (REVATIO) 20 mg tablet Take 20 mg by mouth three (3) times daily.  MULTIVIT-MINERALS/FA/LYCOPENE (ONE-A-DAY MEN'S PO) Take  by mouth.       metoprolol tartrate (LOPRESSOR) 25 mg tablet Take 1 Tab by mouth two (2) times a day. 60 Tab 1    clopidogrel (PLAVIX) 75 mg tab Take 1 Tab by mouth daily. 30 Tab 1    tamsulosin (FLOMAX) 0.4 mg capsule       aspirin delayed-release 81 mg tablet Take 81 mg by mouth.  cpap machine kit Take  by inhalation nightly.  polyethylene glycol (MIRALAX) 17 gram packet Take 17 g by mouth daily.          ALLERGIES  Allergies   Allergen Reactions    Flu Vaccine  (36 Mos+)(Pf) Anaphylaxis    Iodinated Contrast- Oral And Iv Dye Anaphylaxis    Atorvastatin Other (comments)    Crestor [Rosuvastatin] Unknown (comments)     Intolerant; abdominal pain    Iodine Hives     IV dye    Ivp Dye [Fd And C Blue No.1] Swelling    Toradol [Ketorolac Tromethamine] Rash     Other reaction(s): Hives  Profuse sweating    Tricor [Fenofibrate Micronized] Other (comments)     Abdominal pain    Zetia [Ezetimibe] Unknown (comments)     Hot flashes       SOCIAL HISTORY    Social History     Socioeconomic History    Marital status:      Spouse name: Not on file    Number of children: Not on file    Years of education: Not on file    Highest education level: Not on file   Occupational History    Not on file   Social Needs    Financial resource strain: Not on file    Food insecurity:     Worry: Not on file     Inability: Not on file    Transportation needs:     Medical: Not on file     Non-medical: Not on file   Tobacco Use    Smoking status: Former Smoker     Last attempt to quit: 2001     Years since quittin.4    Smokeless tobacco: Never Used   Substance and Sexual Activity    Alcohol use: Yes     Comment: occasional    Drug use: No    Sexual activity: Not on file   Lifestyle    Physical activity:     Days per week: Not on file     Minutes per session: Not on file    Stress: Not on file   Relationships    Social connections:     Talks on phone: Not on file     Gets together: Not on file     Attends Confucianist service: Not on file     Active member of club or organization: Not on file     Attends meetings of clubs or organizations: Not on file     Relationship status: Not on file    Intimate partner violence:     Fear of current or ex partner: Not on file     Emotionally abused: Not on file     Physically abused: Not on file     Forced sexual activity: Not on file   Other Topics Concern     Service Not Asked    Blood Transfusions Not Asked    Caffeine Concern Not Asked    Occupational Exposure Not Asked   [place] Hazards Not Asked    Sleep Concern Not Asked    Stress Concern Not Asked    Weight Concern Not Asked    Special Diet Not Asked    Back Care Not Asked    Exercise Not Asked    Bike Helmet Not Asked   [address] Not Asked    Self-Exams Not Asked   Social History Narrative    Not on file       SUBJECTIVE      Pain Scale: 4/10    Pain Assessment  6/24/2019   Location of Pain Back   Location Modifiers Posterior   Severity of Pain 4   Quality of Pain Throbbing; Other (Comment)   Quality of Pain Comment numbness left leg   Duration of Pain Persistent   Frequency of Pain Constant   Aggravating Factors Walking;Standing; Other (Comment)   Aggravating Factors Comment sitting   Limiting Behavior Some   Relieving Factors Rest   Result of Injury Yes   Work-Related Injury Yes   Type of Injury Other (Comment)   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 REVIEW OF SYSTEMS  ROS    Constitutional: Negative for fever, chills, or weight change. Respiratory: Negative for cough or shortness of breath. Cardiovascular: Negative for chest pain or palpitations. Gastrointestinal: Negative for acid reflux, change in bowel habits, or constipation. Genitourinary: Negative for incontinence, dysuria and flank pain. Musculoskeletal: Positive for lumbar pain. Skin: Negative for rash. Neurological: Negative for headaches, dizziness, or numbness.   Endo/Heme/Allergies: Negative .  Psychiatric/Behavioral: Negative. PHYSICAL EXAMINATION  Visit Vitals  /74   Pulse 78   Temp 97.4 °F (36.3 °C)   Resp 24   Ht 5' 10\" (1.778 m)   Wt 229 lb (103.9 kg)   SpO2 99%   BMI 32.86 kg/m²       Constitutional: Well developed,  well nourished,  awake, alert, and in no acute distress. Neurological:  Sensation to light touch is intact. Psychiatric: Affect and mood are appropriate. Integumentary: No rashes or abrasions noted on exposed areas,  warm, dry and intact. Cardiovascular/Peripheral Vascular:  No peripheral edema is noted. Lymphatic:  No evidence of lymphedema. No cervical lymphadenopathy. SPINE/MUSCULOSKELETAL EXAM    Lumbar spine:  No rash, ecchymosis, or gross obliquity. No fasciculations. No focal atrophy is noted.  Range of motion is intact. No Tenderness to palpation . SI joints non-tender. Trochanters non tender.       Musculoskeletal:  No pain with extension, axial loading, or forward flexion.  No pain with internal or external rotation of his hips.      MOTOR       Hip Flex  Quads Hamstrings Ankle DF EHL Ankle PF   Right +4/5 +4/5 +4/5 +4/5 +4/5 +4/5   Left +4/5 +4/5 +4/5 +4/5 +4/5 +4/5         Straight Leg raise negative bilaterally.      Normal gait and station     Ambulation without assistive device. full weight bearing, non-antalgic gait.           Mary Calderon NP

## 2019-11-21 ENCOUNTER — OFFICE VISIT (OUTPATIENT)
Dept: ORTHOPEDIC SURGERY | Age: 64
End: 2019-11-21

## 2019-11-21 VITALS
RESPIRATION RATE: 18 BRPM | HEART RATE: 80 BPM | OXYGEN SATURATION: 97 % | WEIGHT: 239 LBS | TEMPERATURE: 98.4 F | SYSTOLIC BLOOD PRESSURE: 113 MMHG | HEIGHT: 71 IN | BODY MASS INDEX: 33.46 KG/M2 | DIASTOLIC BLOOD PRESSURE: 74 MMHG

## 2019-11-21 DIAGNOSIS — M48.062 SPINAL STENOSIS OF LUMBAR REGION WITH NEUROGENIC CLAUDICATION: ICD-10-CM

## 2019-11-21 DIAGNOSIS — M96.1 POSTLAMINECTOMY SYNDROME, LUMBAR: ICD-10-CM

## 2019-11-21 RX ORDER — DULOXETIN HYDROCHLORIDE 60 MG/1
60 CAPSULE, DELAYED RELEASE ORAL DAILY
Qty: 90 CAP | Refills: 1 | Status: SHIPPED | OUTPATIENT
Start: 2019-11-21 | End: 2020-10-26

## 2019-11-21 NOTE — PATIENT INSTRUCTIONS
Duloxetine (By mouth)   Duloxetine (doo-LOX-[id number]-teen)  Treats depression, anxiety, diabetic peripheral neuropathy, fibromyalgia, and chronic muscle or bone pain. This medicine is an SSNRI. Brand Name(s): Cymbalta, DermacinRx Radha Caicedo   There may be other brand names for this medicine. When This Medicine Should Not Be Used: This medicine is not right for everyone. Do not use it if you had an allergic reaction to duloxetine. How to Use This Medicine:   Capsule, Delayed Release Capsule  · Take your medicine as directed. Your dose may need to be changed several times to find what works best for you. · Delayed-release capsule: Swallow the capsule whole. Do not crush, chew, break, or open it. · This medicine should come with a Medication Guide. Ask your pharmacist for a copy if you do not have one. · Missed dose: Take a dose as soon as you remember. If it is almost time for your next dose, wait until then and take a regular dose. Do not take extra medicine to make up for a missed dose. · Store the medicine in a closed container at room temperature, away from heat, moisture, and direct light. Drugs and Foods to Avoid:   Ask your doctor or pharmacist before using any other medicine, including over-the-counter medicines, vitamins, and herbal products. · Do not take duloxetine if you have used an MAO inhibitor (MAOI) within the past 14 days. Do not start taking an MAO inhibitor within 5 days of stopping duloxetine. · Some medicines can affect how duloxetine works.  Tell your doctor if you are using any of the following:  ¨ Buspirone, cimetidine, ciprofloxacin, enoxacin, fentanyl, lithium, Becca's wort, theophylline, tramadol, tryptophan, or warfarin  ¨ Amphetamines  ¨ Blood pressure medicine  ¨ Diuretic (water pill)  ¨ Medicine for heart rhythm problems (including flecainide, propafenone, quinidine)  ¨ Medicine to treat migraine headaches (including triptans)  ¨ NSAID pain or arthritis medicine (including aspirin, celecoxib, diclofenac, ibuprofen, naproxen)  ¨ Other medicine to treat depression or mood disorders (including amitriptyline, desipramine, fluoxetine, imipramine, nortriptyline, paroxetine)  ¨ Phenothiazine medicine (including thioridazine)  · Tell your doctor if you use anything else that makes you sleepy. Some examples are allergy medicine, narcotic pain medicine, and alcohol. · Do not drink alcohol while you are using this medicine. Warnings While Using This Medicine:   · Tell your doctor if you are pregnant or breastfeeding, or if you have kidney disease, liver disease, diabetes, digestion problems, glaucoma, heart disease, high or low blood pressure, or problems with urination. Tell your doctor if you smoke or you have a history of seizures, or drug or alcohol addiction. · This medicine may cause the following problems:   ¨ Serious liver problems  ¨ Serotonin syndrome (more likely when used with certain other medicines)  ¨ Increased risk of bleeding problems  ¨ Serious skin reactions  ¨ Low sodium levels in the blood  · This medicine can increase thoughts of suicide. Tell your doctor right away if you start to feel depressed and have thoughts about hurting yourself. · This medicine can cause changes in your blood pressure. This may make you dizzy or drowsy. Do not drive or do anything that could be dangerous until you know how this medicine affects you. Stand up slowly to avoid falls. · Do not stop using this medicine suddenly. Your doctor will need to slowly decrease your dose before you stop it completely. · Your doctor will check your progress and the effects of this medicine at regular visits. Keep all appointments. · Keep all medicine out of the reach of children. Never share your medicine with anyone.   Possible Side Effects While Using This Medicine:   Call your doctor right away if you notice any of these side effects:  · Allergic reaction: Itching or hives, swelling in your face or hands, swelling or tingling in your mouth or throat, chest tightness, trouble breathing  · Anxiety, restlessness, fever, fast heartbeat, sweating, muscle spasms, diarrhea, seeing or hearing things that are not there  · Blistering, peeling, red skin rash  · Confusion, weakness, muscle twitching  · Dark urine or pale stools, nausea, vomiting, loss of appetite, stomach pain, yellow skin or eyes  · Decrease in how much or how often you urinate  · Eye pain, vision changes, seeing halos around lights  · Feeling more energetic than usual  · Lightheadedness, dizziness, or fainting  · Unusual moods or behaviors, worsening depression, thoughts about hurting yourself, trouble sleeping  · Unusual bleeding or bruising  If you notice these less serious side effects, talk with your doctor:   · Decrease in appetite or weight  · Dry mouth, constipation, mild nausea  · Unusual drowsiness, sleepiness, or tiredness  If you notice other side effects that you think are caused by this medicine, tell your doctor. Call your doctor for medical advice about side effects. You may report side effects to FDA at 1-403-FDA-8046  © 2017 [facility] Information is for End User's use only and may not be sold, redistributed or otherwise used for commercial purposes. The above information is an  only. It is not intended as medical advice for individual conditions or treatments. Talk to your doctor, nurse or pharmacist before following any medical regimen to see if it is safe and effective for you.

## 2019-11-21 NOTE — PROGRESS NOTES
[address], [address]  [place], [address] [place] [address] Street  Phone: [phone]  Fax: [phone]  PROGRESS NOTE  Patient: Skyla Hedrick                MRN: [id number]       SSN: [id number]  YOB: 1955        AGE: 59 y.o. SEX: male  Body mass index is 33.33 kg/m². PCP: Gilmar Denney MD  11/21/19    Chief Complaint   Patient presents with    LOW BACK PAIN    Follow-up     W/C       HISTORY OF PRESENT ILLNESS:  Skyla Hedrick is a 59 y.o.  male with history of chronic back and BLE L in L3 distribution and R in L5-S1 distribution pain for 40+ years. Prior history of back problems: Last L MRI about 15 yrs ago that showed congenital and degenerative stenosis L3-5, multi-level. He has had several WC injuries working in the [employer]. He had back surgery over seas about 40+ years ago, he said it was removal of foreign body due to an injury. At last OV 6 mo ago he got a refill of his Cymbalta. Today, he continues to have stenotic back pain. He is neuro intact. He reports the Cymbalta helps with his mental state more than his pain but he benefits from it w/out SEs. Pain is aching and throbbing, pain is worse with walking, standing and affects recreational activities. Pain is better with relaxation. Denies bladder/bowel dysfunction, saddle paresthesia, weakness, gait disturbance, or other neurological deficits. Medications: Cymbalta  with moderate, benefit. Failed Gabapentin and Topamax. He has used Alcohol and Tramadol together in the past-we don't give him any opioids due to this. PMHx: Cardiac stent, can't have MRI, AAA, Gastric Ulcers      ASSESSMENT   Diagnoses and all orders for this visit:    1. Spinal stenosis of lumbar region with neurogenic claudication  -     DULoxetine (CYMBALTA) 60 mg capsule; Take 1 Cap by mouth daily. Indications: Neuropathic Pain    2.  Postlaminectomy syndrome, lumbar  -     DULoxetine (CYMBALTA) 60 mg capsule; Take 1 Cap by mouth daily. Indications: Neuropathic Pain         IMPRESSION AND PLAN:  This is a pt with stenotic back and leg pain who is doing is unchanged since last OV.     > Pt was given information on Cymbalta   > Cont Cymbalta  > HEP  > Mr. Mayito Hair has a reminder for a \"due or due soon\" health maintenance. I have asked that he contact his primary care provider, Cyndie Guerrero MD, for follow-up on this health maintenance.  > We have informed patient to notify us for immediate appointment if he has any worsening neurogical symptoms or if an emergency situation presents, then call 911  >  has been reviewed and is appropriate  > Pt will follow-up in 6 Mo w/ NP. Subjective    Work [employer],       Pain Scale: 7/10    Pain Assessment  11/21/2019   Location of Pain Back   Location Modifiers -   Severity of Pain 7   Quality of Pain Aching; Dmitriy Dauer; Throbbing   Quality of Pain Comment -   Duration of Pain Persistent   Frequency of Pain Intermittent   Aggravating Factors -   Aggravating Factors Comment -   Limiting Behavior Yes   Relieving Factors -   Result of Injury Yes   Work-Related Injury Yes   Type of Injury -   Type of Injury Comment -         REVIEW OF SYSTEMS  Constitutional: Negative for fever, chills, or weight change. Respiratory: Negative for cough or shortness of breath. Cardiovascular: Negative for chest pain or palpitations. Gastrointestinal: Negative for incontinence, acid reflux, change in bowel habits, or constipation. Genitourinary: Negative for incontinence, dysuria and flank pain. Musculoskeletal: Positive for back and leg pain. See HPI. Skin: Negative for rash. Neurological:LLE L3 and RLE L5-S1  radiculopathy. See HPI. Endo/Heme/Allergies: Negative. Psychiatric/Behavioral: Negative.       PHYSICAL EXAMINATION  Visit Vitals  /74   Pulse 80   Temp 98.4 °F (36.9 °C) (Oral)   Resp 18   Ht 5' 11\" (1.803 m)   Wt 239 lb (108.4 kg)   SpO2 97%   BMI 33.33 kg/m² Accompanied by Self. Constitutional:  Well developed, well nourished, in no acute distress. Psychiatric: Affect and mood are appropriate. Integumentary: No rashes or abrasions noted on exposed areas. Cardiovascular/Peripheral Vascular: +2 radial & pedal pulses. No peripheral edema is noted. Lymphatic:  No evidence of lymphedema. No cervical lymphadenopathy. SPINE/MUSCULOSKELETAL EXAM     Lumbar spine:  No rash, ecchymosis, or gross obliquity. No fasciculations. No focal atrophy is noted. Range of motion is decreased with extension. Tenderness to palpation bilateral low back L4-5. No tenderness to palpation at the sciatic notch. SI joints non-tender. Trochanters non tender. Straight leg raise Neg    Sensation grossly intact to light touch. MOTOR:     Hip Flex Quads Hamstrings Ankle DF EHL Ankle PF   Right 5/5 5/5 5/5 5/5 5/5 5/5   Left 5/5 5/5 5/5 5/5 5/5 5/5       Ambulation without assistive device. FWB. Non antalgic gait        PAST MEDICAL HISTORY   Past Medical History:   Diagnosis Date    Aortic aneurysm, abdominal (HCC)     Back pain     Diverticulitis     Hearing loss     Heart disease     ischemic    Hernia of unspecified site of abdominal cavity without mention of obstruction or gangrene     History of myocardial perfusion scan 08/30/2013    Med-sized, fixed inferior defect most likely artifact rather than prior infarction. No ischemia. No WMA. EF 55%.  Holter monitor, normal 09/09/2013    Normal Holter w/5 PVCs. No symptoms reported.  Hypercholesterolemia     Hyperlipidemia     Hypertension     Hypertriglyceridemia     Joint pain     Low back pain radiating to both legs     Lumbar postlaminectomy syndrome     Lung disease     Morbidly obese (HCC)     Multiple gastric ulcers     x 3    Osteoarthritis     S/P cardiac cath 03/09/1994    pRCA 100%. Otherwise patent coronaries. LVEDP 20-25. EF 70%.       Sciatica     Sleep apnea     [place] injury due to [place]    Wears glasses        Past Surgical History:   Procedure Laterality Date    ABDOMEN SURGERY PROC UNLISTED      pancreatitis    CARDIAC CATHETERIZATION  1/4/2017         CORONARY ARTERY ANGIOGRAM  1/4/2017         CORONARY STENT SINGLE W/PTCA  1/4/2017         HX CARPAL TUNNEL RELEASE      left wrist in November 2007, right wrist in December 2007    HX COLONOSCOPY      HX CYST REMOVAL  1976    pilonidal cyst removed below spine    HX ENDOSCOPY      HX GASTRECTOMY      removal of gall bladder, small intestine    HX ORTHOPAEDIC  9/03    microscopic knee surgery on left knee and \"pocket\" syndrome surgery on left knee    LV ANGIOGRAPHY  1/4/2017        . MEDICATIONS      Current Outpatient Medications   Medication Sig Dispense Refill    DULoxetine (CYMBALTA) 60 mg capsule Take 1 Cap by mouth daily. Indications: Neuropathic Pain 90 Cap 1    pantoprazole (PROTONIX) 40 mg tablet Take 40 mg by mouth daily.  raNITIdine (ZANTAC) 150 mg tablet Take 150 mg by mouth.  sucralfate (CARAFATE) 1 gram tablet Take 1 g by mouth.  atorvastatin (LIPITOR) 80 mg tablet Take 80 mg by mouth daily.  omeprazole (PRILOSEC) 40 mg capsule Take 40 mg by mouth daily.  nitroglycerin (NITROSTAT) 0.4 mg SL tablet 0.4 mg by SubLINGual route every five (5) minutes as needed for Chest Pain. Up to 3 doses.  sildenafil, antihypertensive, (REVATIO) 20 mg tablet Take 20 mg by mouth three (3) times daily.  MULTIVIT-MINERALS/FA/LYCOPENE (ONE-A-DAY MEN'S PO) Take 1 Tab by mouth daily.  metoprolol tartrate (LOPRESSOR) 25 mg tablet Take 1 Tab by mouth two (2) times a day. 60 Tab 1    clopidogrel (PLAVIX) 75 mg tab Take 1 Tab by mouth daily. 30 Tab 1    tamsulosin (FLOMAX) 0.4 mg capsule Take 0.4 mg by mouth daily.  cpap machine kit Take  by inhalation nightly.  polyethylene glycol (MIRALAX) 17 gram packet Take 17 g by mouth daily.       aspirin delayed-release 81 mg tablet Take 81 mg by mouth daily. ALLERGIES    Allergies   Allergen Reactions    Flu Vaccine  (36 Mos+)(Pf) Anaphylaxis    Iodinated Contrast Media Anaphylaxis    Atorvastatin Other (comments)    Crestor [Rosuvastatin] Unknown (comments)     Intolerant; abdominal pain    Iodine Hives     IV dye    Ivp Dye [Fd And C Blue No.1] Swelling    Toradol [Ketorolac Tromethamine] Rash     Other reaction(s): Hives  Profuse sweating    Tricor [Fenofibrate Micronized] Other (comments)     Abdominal pain    Zetia [Ezetimibe] Unknown (comments)     Hot flashes          SOCIAL HISTORY    Social History     Socioeconomic History    Marital status:      Spouse name: Not on file    Number of children: Not on file    Years of education: Not on file    Highest education level: Not on file   Occupational History    Not on file   Social Needs    Financial resource strain: Not on file    Food insecurity:     Worry: Not on file     Inability: Not on file    Transportation needs:     Medical: Not on file     Non-medical: Not on file   Tobacco Use    Smoking status: Former Smoker     Last attempt to quit: 2001     Years since quittin.8    Smokeless tobacco: Never Used   Substance and Sexual Activity    Alcohol use:  Yes     Alcohol/week: 7.0 - 14.0 standard drinks     Types: 7 - 14 Cans of beer per week    Drug use: No    Sexual activity: Not on file   Lifestyle    Physical activity:     Days per week: Not on file     Minutes per session: Not on file    Stress: Not on file   Relationships    Social connections:     Talks on phone: Not on file     Gets together: Not on file     Attends Yazidi service: Not on file     Active member of club or organization: Not on file     Attends meetings of clubs or organizations: Not on file     Relationship status: Not on file    Intimate partner violence:     Fear of current or ex partner: Not on file     Emotionally abused: Not on file     Physically abused: Not on file     Forced sexual activity: Not on file   Other Topics Concern     Service Not Asked    Blood Transfusions Not Asked    Caffeine Concern Not Asked    Occupational Exposure Not Asked    Hobby Hazards Not Asked    Sleep Concern Not Asked    Stress Concern Not Asked    Weight Concern Not Asked    Special Diet Not Asked    Back Care Not Asked    Exercise Not Asked    Bike Helmet Not Asked   [address] [facility] Not Asked    Self-Exams Not Asked   Social History Narrative    Not on file       FAMILY HISTORY    Family History   Problem Relation Age of Onset    Heart Surgery Father         triple bypass with stent placement    High Cholesterol Father          Chad Carranza NP

## 2020-03-31 ENCOUNTER — VIRTUAL VISIT (OUTPATIENT)
Dept: ORTHOPEDIC SURGERY | Age: 65
End: 2020-03-31

## 2020-03-31 DIAGNOSIS — M48.062 SPINAL STENOSIS OF LUMBAR REGION WITH NEUROGENIC CLAUDICATION: ICD-10-CM

## 2020-03-31 DIAGNOSIS — M96.1 POSTLAMINECTOMY SYNDROME, LUMBAR: Primary | ICD-10-CM

## 2020-03-31 NOTE — PATIENT INSTRUCTIONS
Therapeutic Ball: Back Exercises Introduction Here are some examples of typical exercises for your condition. Start each exercise slowly. Ease off the exercise if you start to have pain. Your doctor or physical therapist will tell you when you can start these exercises and which ones will work best for you. To prepare, make sure that your ball is the right size for you. When inflated and firm, it should allow you to sit with your hips and knees bent at about a 90-degree angle (like the letter L). How to do the exercises Seated position on ball 1. Use this exercise to get used to moving on the ball and to find your best sitting position. 2. Sit comfortably on the ball with your feet about hip-width apart. If you feel unsteady, rest your hands on the ball near your hips. 3. As you do this exercise, try to keep your shoulders and upper body relaxed and still. 4. Using your stomach and back muscles to move your pelvis, roll the ball forward. This will round your back. 5. Still using your stomach and back muscles, roll the ball back. You will arch your back. 6. Repeat this rounding-arching motion a few times. 7. Stop in between the two positions, where your back is not rounded or arched. This is called your neutral position. Pelvic rotation 1. Sit tall on the ball. 2. Slowly rotate your hips in a [place] pattern. Keep the movement focused at your hips. 3. Repeat, but [place] in the other direction. 4. Repeat 8 to 12 times. Postural sitting 1. Use this position to find a stable, relaxed posture on the ball. You can use this position as your starting point for other ball exercises. If you feel unsteady on the ball, start on a chair first. 
2. Sit on a ball or chair, with your feet planted straight in front of you. 3. Imagine that a string at the top of your head is pulling you straight up. Think of yourself as 2 inches taller than you are. 
4. Slightly tuck your chin. 5. Keep your shoulders back and relaxed. Knee extension 1. Sit tall on the ball with your feet planted in front of you, hip-width apart. As you do this exercise, avoid slumping your shoulders and arching your back. 2. Rest your hands on the ball near your hip or a steady object next to you. (If you feel very stable on the ball, rest your hands in your lap or at your side.) 3. Slowly straighten one leg at the knee. Slowly lower it back down. Repeat with the other leg. 4. Repeat this exercise 8 to 12 times. Roll-ups 1. Lie on your back with your knees bent, feet resting on the floor. 2. Lay the ball on your thighs. Rest your hands up high on the ball. 3. Raising your head and shoulder blades, roll the ball up your thighs. Exhale as you roll up. 4. If this is hard on your neck, gently support your lower head and upper neck with one hand. Don't use that hand to pull your head up. 5. Repeat 8 to 12 times. Ball curls 1. Lie on your back with your ankles resting on the ball, knees straight. 2. Use your legs to roll the exercise ball toward you. Allow your knees to bend and move closer to your chest. 
3. Pause briefly, and then roll the ball to the starting position. Try to keep the ball rolling straight. You will feel the muscles in your lower belly working. 4. Repeat 8 to 12 times. Bridge with ball under legs 1. Lie on your back with your legs up, calves resting on the ball. For more challenge, rest your heels on the ball. 2. Look up at the ceiling, and keep your chin relaxed. You can place a small pillow under your head or neck for comfort. 3. With your arms by your side, press your hands onto the floor for stability. 4. Tighten your belly muscles by pulling in your belly button toward your spine. 5. Push your heels down toward the floor, squeeze your buttocks, and lift your hips off the floor until your shoulders, hips, and knees are all in a straight line. 6. Try to keep the ball steady. Hold for about 6 seconds as you continue to breathe normally. 7. Slowly lower your hips back down to the floor. 8. Repeat 8 to 12 times. Ball curls with bridge 1. Start flat on your back with your ankles resting on the ball. 2. Look up at the ceiling, and keep your chin relaxed. You can place a small pillow under your head or neck for comfort. 3. With your arms by your side, press your hands onto the floor for stability. 4. Tighten your belly muscles by pulling in your belly button toward your spine. 5. Push your heels down toward the floor, squeeze your buttocks, and lift your hips off the floor until your shoulders, hips, and knees are all in a straight line. 6. While holding the bridge position, roll the ball toward you with your heels. Keep your hips as level as you can. 7. Pause briefly, and then roll the ball back out. Try to keep the ball rolling straight. You will feel the muscles in your lower belly working as you straighten your legs. 8. Lower your hips, and return to your starting position. 9. Repeat 8 to 12 times. 10. When you can keep your body and the ball steady throughout this exercise, you're ready for more challenge. Try keeping your hips raised while rolling the ball out, holding the bridge, and rolling back, a few times in a row. Praying chacho 1. Kneel upright with the ball in front of you. 2. To start, clasp your hands together. Rest them on the ball in front of you. 3. As you do this exercise, keep your back and hips straight and tighten your belly and buttocks muscles. Keep your knees in place. 4. Press on the ball with your arms. Lean forward from the knees. This rolls the ball forward. You will bear most of your weight on your arms. 5. If your back starts to ache, you've gone too far. Pull back a bit. 6. Roll back to the start position. 7. Repeat 8 to 12 times. Walk-out plank on ball 1. Kneel over the ball. Place your hands on the floor in front of you. 2. Walk your hands forward until your legs are straight on the ball. This is the plank position. 3. When in plank position, hold your body straight and tighten your belly and buttocks muscles. Keep your chin slightly tucked. 4. Roll as far forward as you can without losing your balance or letting your hips drop. You may stop with the ball under your thighs, or even under your knees or shins. 5. Hold a few seconds, then walk your hands back and return to the start position. 6. Repeat 8 to 12 times. Push-up with thighs on ball 1. Kneel over the ball. Place your hands on the floor in front of you. 2. Walk your hands forward until your legs are straight on the ball. This is the plank position. 3. When in plank position, hold your body straight and tighten your belly and buttocks muscles. Keep your chin slightly tucked. 4. Roll as far forward as you can without losing your balance or letting your hips drop. You may stop with the ball under your thighs, or even under your knees or shins. 5. Bend your elbows. Slowly lower your body toward the ground as far as you can without losing your balance. 6. If your wrists hurt, try moving your hands a little farther apart so they're not right under your shoulders. 7. Slowly straighten your arms. 8. Do 8 to 12 of these push-ups. Wall squat with ball 1. Stand facing away from a wall. Place your feet about shoulder-width apart. 2. Place the ball between your middle back and the wall. Move your feet out in front of you so they are about a foot in front of your hips. 3. Keep your arms at your sides, or put your hands on your hips. 4. Slowly squat down as if you are going to sit in a chair, rolling your back over the ball as you squat. The ball should move with you but stay pressed into the wall. 5. Be sure that your knees do not go in front of your toes as you squat. 6. Hold for 6 seconds. 7. Slowly rise to your standing position. 8. Repeat 8 to 12 times. Child's pose with ball 1. Kneeling upright with your back straight, rest your hands on the ball in front of you. 2. Breathe out as you bend at the hips, and roll the ball forward. Lower your chest toward the ground, and drop your hips back toward your heels. 3. To stretch your upper back and shoulders, hold this position for 15 to 30 seconds. 4. Repeat 2 to 4 times. Follow-up care is a key part of your treatment and safety. Be sure to make and go to all appointments, and call your doctor if you are having problems. It's also a good idea to know your test results and keep a list of the medicines you take. Where can you learn more? Go to http://yahir-gary.info/ Enter P829 in the search box to learn more about \"Therapeutic Ball: Back Exercises. \" Current as of: June 26, 2019Content Version: 12.4 © 3989-5493 [employer]. Care instructions adapted under license by [employer] (which disclaims liability or warranty for this information). If you have questions about a medical condition or this instruction, always ask your healthcare professional. [place] [address] any warranty or liability for your use of this information. [email or web address] [email or web address].

## 2020-03-31 NOTE — PROGRESS NOTES
Tammie López is a 59 y.o. male who was seen by synchronous (real-time) audio technology on 3/31/2020. He has a history of centralized low back pain. Pt denies radicular symptoms at this time which were noted to extend into the  RLE in an S1 distribution to the ankle.  The patient was diagnosed with lumbar postlaminectomy syndrome. Symptoms exacerbated with activity. He reports intolerance to NEURONTIN. Pt is compliant with Cymbalta 60 mg daily.  Pt has h/o myocardial infarction x 3. He underwent cardiac stent placement recently at [facility] by Dr. Pamela Brumfield in 1/2017. He is on Plavix. Pt is no longer being followed by Dr. Yris Isbell denies hx of glaucoma. Pt reports of chronic bowel incontinence since his bowel resection from one year ago and chronic bladder incontinence ~ x 10 years. Pt is not a candidate for MRI at this time due to cardiac stent placement. He has taken Tramadol in the past without benefit. He was using an old rx and took it maybe 3 times a month. He was last seen and was to continue Cymbalta 61 mg. He was in the [employer] for years and did a lot of parachute jumps. He is trying to find a practice in [place] to manage him as the drive is far. He is waiting to hear back from [facility]ESS[facility] Mun[facility]. At his last visit, he stated he had good  And bad days. When he has pain, it is a lumbar pain R>L and will radiate down his right posterior thigh and some left anterior thigh numbness that is improving some. His right thigh is feeling some numb. He is riding his bicycle in front of the TV each morning.  He was to continue Cymbalta 60 mg. he was last seen with RENO Gonzalez. He was to continue Cymbalta. Alexis Caba continues to work and \"keeps his nerves calm. \"  Denies bladder/bowel dysfunction, saddle paresthesia, weakness, gait disturbance, or other neurological deficit. Pt at this time desires to continue with current care/proceed with medication evaluation. He does admit to taking an old tramadol 1 every 4 months with a beer when his pain is so severe. I would not prescribe further tramadol due to this. We discussed this is not safe. He verbalized understanding.      Lumbar spine MRI: per previous note/report revealed developmentally small spinal canal and developmentally small foramina with superimposed degenerative facet disease. There were no focal disc protrusions or significant spinal canal stenosis. There were multiple levels of foraminal stenosis, partially developmental and partially due to degenerative facet changes. It was most marked at L3-L4 and L4-L5 and to a lesser degree at L2-L3    ASSESSMENT  59 y.o. male with back pain. Diagnoses and all orders for this visit:    1. Postlaminectomy syndrome, lumbar    2. Spinal stenosis of lumbar region with neurogenic claudication           IMPRESSION/PLAN    1) Pt was given information on back exercises. 2) cont Cymbalta, can call for Refill when needed  3) cont HEP  4) Mr. Jesus Ross has a reminder for a \"due or due soon\" health maintenance. I have asked that he contact his primary care provider, Billy Ramos MD, for follow-up on this health maintenance. 5) We have informed patient to notify us for immediate appointment if he has any worsening neurogical symptoms or if an emergency situation presents, then call 911  6) Pt will follow-up in 6 months for med fu. Risks and benefits of ongoing  therapy have been reviewed with the patient.  is appropriate. No pain behaviors. Denies thoughts of harming self or others. Pt has a good risk to benefit ratio which allows the pt to function in a home environment without side effects. Assessment & Plan:   Diagnoses and all orders for this visit:    1. Postlaminectomy syndrome, lumbar    2. Spinal stenosis of lumbar region with neurogenic claudication                  Subjective:    Samara Del Rosario was seen for Back Pain (lower)        PAST MEDICAL HISTORY  Past Medical History:   Diagnosis Date    Aortic aneurysm, abdominal ([facility] [address]     Back pain     Diverticulitis     Hearing loss     Heart disease     ischemic    Hernia of unspecified site of abdominal cavity without mention of obstruction or gangrene     History of myocardial perfusion scan 08/30/2013    Med-sized, fixed inferior defect most likely artifact rather than prior infarction. No ischemia. No WMA. EF 55%.  Holter monitor, normal 09/09/2013    Normal Holter w/5 PVCs. No symptoms reported.  Hypercholesterolemia     Hyperlipidemia     Hypertension     Hypertriglyceridemia     Joint pain     Low back pain radiating to both legs     Lumbar postlaminectomy syndrome     Lung disease     Morbidly obese (HCC)     Multiple gastric ulcers     x 3    Osteoarthritis     S/P cardiac cath 03/09/1994    pRCA 100%. Otherwise patent coronaries. LVEDP 20-25. EF 70%.  Sciatica     Sleep apnea     [place] due to [place]    Wears glasses         MEDICATIONS  Current Outpatient Medications   Medication Sig Dispense Refill    DULoxetine (CYMBALTA) 60 mg capsule Take 1 Cap by mouth daily. Indications: Neuropathic Pain 90 Cap 1    pantoprazole (PROTONIX) 40 mg tablet Take 40 mg by mouth daily.  atorvastatin (LIPITOR) 80 mg tablet Take 80 mg by mouth daily.  MULTIVIT-MINERALS/FA/LYCOPENE (ONE-A-DAY MEN'S PO) Take 1 Tab by mouth daily.  metoprolol tartrate (LOPRESSOR) 25 mg tablet Take 1 Tab by mouth two (2) times a day. 60 Tab 1    clopidogrel (PLAVIX) 75 mg tab Take 1 Tab by mouth daily. 30 Tab 1    tamsulosin (FLOMAX) 0.4 mg capsule Take 0.4 mg by mouth daily.  raNITIdine (ZANTAC) 150 mg tablet Take 150 mg by mouth.  sucralfate (CARAFATE) 1 gram tablet Take 1 g by mouth.  omeprazole (PRILOSEC) 40 mg capsule Take 40 mg by mouth daily.  nitroglycerin (NITROSTAT) 0.4 mg SL tablet 0.4 mg by SubLINGual route every five (5) minutes as needed for Chest Pain. Up to 3 doses.       polyethylene glycol (MIRALAX) 17 gram packet Take 17 g by mouth daily.  sildenafil, antihypertensive, (REVATIO) 20 mg tablet Take 20 mg by mouth three (3) times daily.  aspirin delayed-release 81 mg tablet Take 81 mg by mouth daily.  cpap machine kit Take  by inhalation nightly. ALLERGIES  Allergies   Allergen Reactions    Flu Vaccine  (36 Mos+)(Pf) Anaphylaxis    Iodinated Contrast Media Anaphylaxis    Atorvastatin Other (comments)    Crestor [Rosuvastatin] Unknown (comments)     Intolerant; abdominal pain    Iodine Hives     IV dye    Ivp Dye [Fd And C Blue No.1] Swelling    Toradol [Ketorolac Tromethamine] Rash     Other reaction(s): Hives  Profuse sweating    Tricor [Fenofibrate Micronized] Other (comments)     Abdominal pain    Zetia [Ezetimibe] Unknown (comments)     Hot flashes       SOCIAL HISTORY    Social History     Socioeconomic History    Marital status:      Spouse name: Not on file    Number of children: Not on file    Years of education: Not on file    Highest education level: Not on file   Occupational History    Not on file   Social Needs    Financial resource strain: Not on file    Food insecurity     Worry: Not on file     Inability: Not on file    Transportation needs     Medical: Not on file     Non-medical: Not on file   Tobacco Use    Smoking status: Former Smoker     Last attempt to quit: 2001     Years since quittin.2    Smokeless tobacco: Never Used   Substance and Sexual Activity    Alcohol use:  Yes     Alcohol/week: 7.0 - 14.0 standard drinks     Types: 7 - 14 Cans of beer per week    Drug use: No    Sexual activity: Not on file   Lifestyle    Physical activity     Days per week: Not on file     Minutes per session: Not on file    Stress: Not on file   Relationships    Social connections     Talks on phone: Not on file     Gets together: Not on file     Attends Caodaism service: Not on file     Active member of club or organization: Not on file Attends meetings of clubs or organizations: Not on file     Relationship status: Not on file    Intimate partner violence     Fear of current or ex partner: Not on file     Emotionally abused: Not on file     Physically abused: Not on file     Forced sexual activity: Not on file   Other Topics Concern     Service Not Asked    Blood Transfusions Not Asked    Caffeine Concern Not Asked    Occupational Exposure Not Asked   Rodolph New Hazards Not Asked    Sleep Concern Not Asked    Stress Concern Not Asked    Weight Concern Not Asked    Special Diet Not Asked    Back Care Not Asked    Exercise Not Asked    Bike Helmet Not Asked   [address] Not Asked    Self-Exams Not Asked   Social History Narrative    Not on file       Pain Scale: /10    Pain Assessment  3/31/2020   Location of Pain Back   Location Modifiers -   Severity of Pain 1   Quality of Pain Sharp   Quality of Pain Comment -   Duration of Pain Persistent   Frequency of Pain Constant   Aggravating Factors Other (Comment)   Aggravating Factors Comment sitting too long   Limiting Behavior Yes   Relieving Factors Rest   Result of Injury Yes   Work-Related Injury Yes   Type of Injury Fall   Type of Injury Comment -         ROS      Objective:       General: alert, cooperative, no distress, appears stated age  Constitutional:  no acute distress. Psychiatric: Affect and mood are appropriate. Due to this being a TeleHealth evaluation, many elements of the physical examination are unable to be assessed. We discussed the expected course, resolution and complications of the diagnosis(es) in detail. Medication risks, benefits, costs, interactions, and alternatives were discussed as indicated. I advised him to contact the office if his condition worsens, changes or fails to improve as anticipated. He expressed understanding with the diagnosis(es) and plan.        Pursuant to the emergency declaration under the [address] Emergencies Act, 1135 waiver authority and the Coronavirus Preparedness and Response Supplemental Appropriations Act, this Virtual  Visit was conducted, with patient's consent, to reduce the patient's risk of exposure to COVID-19 and provide continuity of care for an established patient. Services were provided through a audio synchronous discussion virtually to substitute for in-person clinic visit.     Reema Mendiola NP

## 2020-09-29 ENCOUNTER — OFFICE VISIT (OUTPATIENT)
Dept: ORTHOPEDIC SURGERY | Age: 65
End: 2020-09-29
Payer: OTHER GOVERNMENT

## 2020-09-29 DIAGNOSIS — M48.062 SPINAL STENOSIS OF LUMBAR REGION WITH NEUROGENIC CLAUDICATION: ICD-10-CM

## 2020-09-29 DIAGNOSIS — M51.26 OTHER INTERVERTEBRAL DISC DISPLACEMENT, LUMBAR REGION: ICD-10-CM

## 2020-09-29 DIAGNOSIS — M96.1 POSTLAMINECTOMY SYNDROME, LUMBAR: Primary | ICD-10-CM

## 2020-09-29 PROCEDURE — 99442 PR PHYS/QHP TELEPHONE EVALUATION 11-20 MIN: CPT | Performed by: PHYSICAL MEDICINE & REHABILITATION

## 2020-09-29 NOTE — LETTER
9/29/20 Patient: Toña Ramon YOB: 1955 Date of Visit: 9/29/2020 Lux Thomas MD 
[address] [address] VIA Facsimile: [phone] Dear Lux Thomas MD, Thank you for referring Mr. Magdalene Love to [address] for evaluation. My notes for this consultation are attached. If you have questions, please do not hesitate to call me. I look forward to following your patient along with you. Sincerely, Mehdi Blair MD

## 2020-09-29 NOTE — PROGRESS NOTES
[facility]  [address]  Phone: [phone]  Fax: [phone]        PROGRESS NOTE    CONSENT:  Pursuant to the emergency declaration under the [employer] and [facility], 1135 waiver authority and the [employer] Act, this Virtual Visit was conducted, with patient's consent, to reduce the patient's risk of exposure to COVID-19 and provide continuity of care for an established patient. Services were unable to be provided through a video synchronous discussion virtually to substitute for in-person appointment. Subsequently, the patient was consulted through a telephone discussion. ENCOUNTER DURATION (minutes): 11         HISTORY OF PRESENT ILLNESS:  The patient is a 72 y.o. male and is being consulted via telephone at the [facility] office for follow up of centralized low back pain. Pt denies radicular symptoms at this time which were noted to extend into the RLE in an S1 distribution to the ankle. He describes pain as a burning sensation. Pt continues to endorse left anterior thigh numbness. The patient was diagnosed with lumbar postlaminectomy syndrome. Symptoms exacerbated with activity. He reports intolerance to NEURONTIN. Pt is compliant with Cymbalta 60 mg daily. He continues with Tramadol prn without relief. Pt completed Medrol Dosepak with slight relief. Pt has h/o myocardial infarction x 3. He underwent cardiac stent placement at [facility] by Dr. Aure Westfall in 1/2017. He is on Plavix. Pt is no longer being followed by . Kirk Mcintosh denies hx of glaucoma. Pt reports of chronic bowel incontinence since his bowel resection from one year ago and chronic bladder incontinence ~ x 10 years. He states he was being evaluated in [place] for possible lung cancer which was ruled out and was dx with asbestosis.  Pt reports of chronic suicidal ideation but has no plans on harming himself at this time. His PCP was Dr. Alonso Yao but has stopped seeing him as he lives in [place]. Pt is not actively being followed by a psychiatrist. Pt is on Plavix which per patient is likely chronic. He was previously followed by Dr. Clay Mojica, cardiologist. Pt is not a candidate for MRI at this time due to cardiac stent placement. He denies h/o glaucoma. Lumbar spine MRI per report revealed developmentally small spinal canal and developmentally small foramina with superimposed degenerative facet disease. There were no focal disc protrusions or significant spinal canal stenosis. There were multiple levels of foraminal stenosis, partially developmental and partially due to degenerative facet changes. It was most marked at L3-L4 and L4-L5 and to a lesser degree at L2-L3. At his last clinic appointment, patient wished to continue his current treatment. He was provided with refills of his Cymbalta 60 mg.     At today's telephone consultation, the patient c/o low back pain. He rates his pain 0-7/10, previously 2/10. He reports occasional radicular symptoms into his RLE. Additionally, he endorses neck pain. He is compliant with the Cymbalta 60 mg daily. Pt denies change in bowel or bladder habits. Pt denies any signs of weakness. Pt is no longer followed by Dr. Clya Mojica, cardiologist.  He is taking Plavix through Dr. Radha Flood, cardiologist in [place], [place].  reviewed. There is no height or weight on file to calculate BMI. PCP: Estefania Esparza MD      Past Medical History:   Diagnosis Date    Aortic aneurysm, abdominal ([address]     Back pain     Diverticulitis     Hearing loss     Heart disease     ischemic    Hernia of unspecified site of abdominal cavity without mention of obstruction or gangrene     History of myocardial perfusion scan 08/30/2013    Med-sized, fixed inferior defect most likely artifact rather than prior infarction. No ischemia. No WMA. EF 55%.       Holter monitor, normal 2013    Normal Holter w/5 PVCs. No symptoms reported.  Hypercholesterolemia     Hyperlipidemia     Hypertension     Hypertriglyceridemia     Joint pain     Low back pain radiating to both legs     Lumbar postlaminectomy syndrome     Lung disease     Morbidly obese (HCC)     Multiple gastric ulcers     x 3    Osteoarthritis     S/P cardiac cath 1994    pRCA 100%. Otherwise patent coronaries. LVEDP 20-25. EF 70%.  Sciatica     Sleep apnea     [place] injury due to [place]nel     [place]    Wears glasses         Social History     Socioeconomic History    Marital status:      Spouse name: Not on file    Number of children: Not on file    Years of education: Not on file    Highest education level: Not on file   Occupational History    Not on file   Social Needs    Financial resource strain: Not on file    Food insecurity     Worry: Not on file     Inability: Not on file    Transportation needs     Medical: Not on file     Non-medical: Not on file   Tobacco Use    Smoking status: Former Smoker     Last attempt to quit: 2001     Years since quittin.7    Smokeless tobacco: Never Used   Substance and Sexual Activity    Alcohol use:  Yes     Alcohol/week: 7.0 - 14.0 standard drinks     Types: 7 - 14 Cans of beer per week    Drug use: No    Sexual activity: Not on file   Lifestyle    Physical activity     Days per week: Not on file     Minutes per session: Not on file    Stress: Not on file   Relationships    Social connections     Talks on phone: Not on file     Gets together: Not on file     Attends Alevism service: Not on file     Active member of club or organization: Not on file     Attends meetings of clubs or organizations: Not on file     Relationship status: Not on file    Intimate partner violence     Fear of current or ex partner: Not on file     Emotionally abused: Not on file     Physically abused: Not on file     Forced sexual activity: Not on file   Other Topics Concern     Service Not Asked    Blood Transfusions Not Asked    Caffeine Concern Not Asked    Occupational Exposure Not Asked    Hobby Hazards Not Asked    Sleep Concern Not Asked    Stress Concern Not Asked    Weight Concern Not Asked    Special Diet Not Asked    Back Care Not Asked    Exercise Not Asked    Bike Helmet Not Asked   [address] Not Asked    Self-Exams Not Asked   Social History Narrative    Not on file       Current Outpatient Medications   Medication Sig Dispense Refill    DULoxetine (CYMBALTA) 60 mg capsule Take 1 Cap by mouth daily. Indications: Neuropathic Pain 90 Cap 1    pantoprazole (PROTONIX) 40 mg tablet Take 40 mg by mouth daily.  raNITIdine (ZANTAC) 150 mg tablet Take 150 mg by mouth.  sucralfate (CARAFATE) 1 gram tablet Take 1 g by mouth.  atorvastatin (LIPITOR) 80 mg tablet Take 80 mg by mouth daily.  omeprazole (PRILOSEC) 40 mg capsule Take 40 mg by mouth daily.  nitroglycerin (NITROSTAT) 0.4 mg SL tablet 0.4 mg by SubLINGual route every five (5) minutes as needed for Chest Pain. Up to 3 doses.  polyethylene glycol (MIRALAX) 17 gram packet Take 17 g by mouth daily.  sildenafil, antihypertensive, (REVATIO) 20 mg tablet Take 20 mg by mouth three (3) times daily.  MULTIVIT-MINERALS/FA/LYCOPENE (ONE-A-DAY MEN'S PO) Take 1 Tab by mouth daily.  metoprolol tartrate (LOPRESSOR) 25 mg tablet Take 1 Tab by mouth two (2) times a day. 60 Tab 1    clopidogrel (PLAVIX) 75 mg tab Take 1 Tab by mouth daily. 30 Tab 1    tamsulosin (FLOMAX) 0.4 mg capsule Take 0.4 mg by mouth daily.  aspirin delayed-release 81 mg tablet Take 81 mg by mouth daily.  cpap machine kit Take  by inhalation nightly.          Allergies   Allergen Reactions    Flu Vaccine 2011 (36 Mos+)(Pf) Anaphylaxis    Iodinated Contrast Media Anaphylaxis    Atorvastatin Other (comments)    Crestor [Rosuvastatin] Unknown (comments) Intolerant; abdominal pain    Iodine Hives     IV dye    Ivp Dye [Fd And C Blue No.1] Swelling    Toradol [Ketorolac Tromethamine] Rash     Other reaction(s): Hives  Profuse sweating    Tricor [Fenofibrate Micronized] Other (comments)     Abdominal pain    Zetia [Ezetimibe] Unknown (comments)     Hot flashes          PHYSICAL EXAMINATION  Unable to perform examination secondary to COVID-19. CONSTITUTIONAL: NAD, A&O x 3    ASSESSMENT   Diagnoses and all orders for this visit:    1. Postlaminectomy syndrome, lumbar    2. Spinal stenosis of lumbar region with neurogenic claudication    3. Other intervertebral disc displacement, lumbar region        IMPRESSION AND PLAN:  The patient consented to the tele health visit and was aware that there would be a charge. During today's telephone consultation patient had c/o low back pain. Multiple treatment options were discussed. Pt is interested in blocks. In the past we have been told he cannot have an MRI due to placement of cardiac stents in 1/2017. It has now been nearly 3.5 years since stents were place. I assume it is now okay to proceed with an MRI. I will check with Dr. Patti Wick office and order a L spine MRI. I advised patient to bring copies of films to next visit. He reports he did not need a refill of Cymbalta 60 mg daily at this time. Pt appears to be neurologically intact. I will see the patient back following the MRI or earlier if needed. Written by Venson Simmonds, as dictated by Sidra Mendoza MD  I examined the patient, reviewed and agree with the note.

## 2020-10-24 DIAGNOSIS — M96.1 POSTLAMINECTOMY SYNDROME, LUMBAR: ICD-10-CM

## 2020-10-24 DIAGNOSIS — M48.062 SPINAL STENOSIS OF LUMBAR REGION WITH NEUROGENIC CLAUDICATION: ICD-10-CM

## 2020-10-26 RX ORDER — DULOXETIN HYDROCHLORIDE 60 MG/1
CAPSULE, DELAYED RELEASE ORAL
Qty: 90 CAP | Refills: 0 | Status: SHIPPED | OUTPATIENT
Start: 2020-10-26

## 2020-10-27 ENCOUNTER — TELEPHONE (OUTPATIENT)
Dept: ORTHOPEDIC SURGERY | Age: 65
End: 2020-10-27

## 2020-10-27 DIAGNOSIS — M96.1 POSTLAMINECTOMY SYNDROME, LUMBAR: ICD-10-CM

## 2020-10-27 DIAGNOSIS — M51.26 OTHER INTERVERTEBRAL DISC DISPLACEMENT, LUMBAR REGION: ICD-10-CM

## 2020-10-27 DIAGNOSIS — M48.062 SPINAL STENOSIS OF LUMBAR REGION WITH NEUROGENIC CLAUDICATION: Primary | ICD-10-CM

## 2020-11-03 NOTE — TELEPHONE ENCOUNTER
DO Heena Haley LPN    Caller: Unspecified (1 week ago,  2:44 PM)               Ms. Charline Plata,   I have not personally seen this patient since back in 2014, but he did present to the hospital with a non-ST elevation MI in January 2017 and had stenting by Dr. Fazal Falk of his thrombotic LAD. Drake Jayeshmon he has had some other type of stenting much more recently I should think he should have no problem with an MRI.  Usually with a coronary stent you need to wait 6 to 8 weeks before doing an MRI procedure, but again I should reiterate that I have not seen this patient myself since 2014. Luis Enrique White D.O., F.A.C.C.

## 2020-11-04 NOTE — TELEPHONE ENCOUNTER
MRI & f/u after Returned call to Vince from [employer].  Informed him that the duration will remain the same to end on 8/6/19 as previously ordered.  Vince states that patient began this morning.  No further questions were asked.

## 2020-11-04 NOTE — TELEPHONE ENCOUNTER
Called patient to see which facility he would like to go to, but did not get an answer. Will try again.

## 2020-11-05 NOTE — TELEPHONE ENCOUNTER
Patient called said he wants to go to [address] [place] [address] - (possibly [place]) for his MRI    Please keep in mind he's getting prepared for heart sx due to a level 5.7 aneurism-- no date set yet    Pt p#[phone]

## 2020-11-06 NOTE — TELEPHONE ENCOUNTER
Aneurysm? I don't recall that. I realize no date, but is it soon. Any idea what they are waiting for? If it's soon, might wait on MRI. If we have no idea on time frame, then I guess move forward.

## 2020-11-10 NOTE — TELEPHONE ENCOUNTER
Called patient. His wife answered and stated \"He's sleeping what can I do for you. \" I stated \" Sure, can I ask who I am speaking with?\" She replied \" I am his wife, and I am on call so I can't be on this line too long can so can you hurry up, I thought you were the hospital calling me in\" I explained that I needed to make sure she was on his HIPPA form. She replied with \" Well, can you just call back another time\" I stated [facility]. Closing message at this time. Patient or spouse can call back to schedule.

## 2021-02-01 NOTE — ROUTINE PROCESS
DOING WELL 2 7 19. TRANSFER - OUT REPORT:    Verbal report given to [address] Robertson RN(name) on Aliya Arenas  being transferred to CVT CCU(unit) for routine progression of care       Report consisted of patients Situation, Background, Assessment and   Recommendations(SBAR). Information from the following report(s) SBAR, Kardex, Intake/Output, MAR and Med Rec Status was reviewed with the receiving nurse. Lines:       Opportunity for questions and clarification was provided.       Patient transported with:   Monitor  O2 @ 2 lpm  liters  Registered Nurse

## 2021-03-03 ENCOUNTER — TELEPHONE (OUTPATIENT)
Dept: ORTHOPEDIC SURGERY | Age: 66
End: 2021-03-03

## 2021-03-03 NOTE — TELEPHONE ENCOUNTER
Pt was suppose to get a lumbar MRI in [facility]. Did he ever have that done? If not, then a virtual should be okay.

## 2021-03-03 NOTE — TELEPHONE ENCOUNTER
Patient called stating his medication from his double aneurysm surgery is making him extremely dizzy, and he cannot drive. He is wondering if his appointment on 03/12 can be a telephone call VV. Please advise patient at [phone].

## 2021-03-12 NOTE — TELEPHONE ENCOUNTER
Patient had to cancel his appointment for today because he lost his wallet and cannot drive to appointment. He wants to know if we can just order the CT scan for him to have done in [place] and he'll follow up after. Please advise patient at [phone].

## 2021-03-16 NOTE — TELEPHONE ENCOUNTER
Called patient but did not get an answer. LVM stating he would have to be seen prior to any studies being ordered. I also stated that a VV would be ok if that is better for him. Left number for him to call back to schedule.

## 2021-04-26 NOTE — PROGRESS NOTES
[facility]  [address], [place]   Phone: [phone]  Fax: [phone]        PROGRESS NOTE    CONSENT:  Pursuant to the emergency declaration under the [address] and [facility], 1135 waiver authority and the [employer] Act, this Virtual Visit was conducted, with patient's consent, to reduce the patient's risk of exposure to COVID-19 and provide continuity of care for an established patient. Services were unable to be provided through a video synchronous discussion virtually to substitute for in-person appointment. Subsequently, the patient was consulted through a telephone discussion. ENCOUNTER DURATION (minutes): 12 minutes 12 seconds      HISTORY OF PRESENT ILLNESS:  The patient is a 72 y.o. male. Mr. Rene Jean is being consulted from home by me via telephone at the [facility] office for follow up of centralized low back pain. Pt denies radicular symptoms at this time which were previously noted to extend into the RLE in an S1 distribution to the ankle. He describes pain as a burning sensation. Pt continues to endorse left anterior thigh numbness. His lower back pain is covered by workers comp. The patient was diagnosed with lumbar postlaminectomy syndrome. Symptoms exacerbated with activity. He reports intolerance to NEURONTIN. Pt is compliant with Cymbalta 60 mg daily. He continues with Tramadol prn without relief. Pt completed Medrol Dosepak with slight relief. Pt has h/o myocardial infarction x 3. He underwent cardiac stent placement at [facility] by Dr. Annika Lockhart in 1/2017. He is taking Plavix through Dr. Yvan Burt, cardiologist in [place], [place]. Pt denies hx of glaucoma. Pt reports of chronic bowel incontinence since his bowel resection from one year ago and chronic bladder incontinence ~ x 10 years.  He states he was being evaluated in [place] for possible lung cancer which was ruled out and was dx with asbestosis. Pt reports of chronic suicidal ideation but has no plans on harming himself at this time. Pt is not actively being followed by a psychiatrist. Pt is not a candidate for MRI at this time due to cardiac stent placement. Pt is a poor historian. Note from Sia Nova NP dated 3/31/2020 indicating patient was seen with c/o centralized low back pain. He continued on Cymbalta 60 mg daily. Lumbar spine MRI per report revealed developmentally small spinal canal and developmentally small foramina with superimposed degenerative facet disease. There were no focal disc protrusions or significant spinal canal stenosis. There were multiple levels of foraminal stenosis, partially developmental and partially due to degenerative facet changes. It was most marked at L3-L4 and L4-L5 and to a lesser degree at L2-L3. At his last clinic appointment, pt was interested in blocks. In the past we had been told he cannot have an MRI due to placement of cardiac stents in 1/2017. It has been nearly 3.5 years since stents were place. I assumed it was now okay to proceed with an MRI. I checked with Dr. Evelyn Lamar office and ordered a L spine MRI. He reported he did not need a refill of Cymbalta 60 mg daily at that time       At today's telephone consultation, the patient c/o low back pain radiating into the right buttocks. He rates his pain 1-4/10, previously 0-7/10. He denies radicular pain into the RLE. His pain is improved by sitting and lying down. He is compliant with the Cymbalta 60 mg daily. Pt denies change in bowel or bladder habits. Pt denies any signs of weakness. Pt is now followed by Dr. Apolianr Garrison, Neurology for chronic HA's and neck pain. Pt underwent aortobiiliac stent grafting in 12/2020. He is taking Plavix.  reviewed. There is no height or weight on file to calculate BMI.     PCP: James Mercado MD      Past Medical History:   Diagnosis Date    Aortic aneurysm, abdominal ([facility] [address]     Back pain     Diverticulitis     Hearing loss     Heart disease     ischemic    Hernia of unspecified site of abdominal cavity without mention of obstruction or gangrene     History of myocardial perfusion scan 2013    Med-sized, fixed inferior defect most likely artifact rather than prior infarction. No ischemia. No WMA. EF 55%.  Holter monitor, normal 2013    Normal Holter w/5 PVCs. No symptoms reported.  Hypercholesterolemia     Hyperlipidemia     Hypertension     Hypertriglyceridemia     Joint pain     Low back pain radiating to both legs     Lumbar postlaminectomy syndrome     Lung disease     Morbidly obese (HCC)     Multiple gastric ulcers     x 3    Osteoarthritis     S/P cardiac cath 1994    pRCA 100%. Otherwise patent coronaries. LVEDP 20-25. EF 70%.  Sciatica     Sleep apnea     [place] injury due to [place]    Wears glasses         Social History     Socioeconomic History    Marital status:      Spouse name: Not on file    Number of children: Not on file    Years of education: Not on file    Highest education level: Not on file   Occupational History    Not on file   Social Needs    Financial resource strain: Not on file    Food insecurity     Worry: Not on file     Inability: Not on file    Transportation needs     Medical: Not on file     Non-medical: Not on file   Tobacco Use    Smoking status: Former Smoker     Quit date: 2001     Years since quittin.3    Smokeless tobacco: Never Used   Substance and Sexual Activity    Alcohol use:  Yes     Alcohol/week: 7.0 - 14.0 standard drinks     Types: 7 - 14 Cans of beer per week    Drug use: No    Sexual activity: Not on file   Lifestyle    Physical activity     Days per week: Not on file     Minutes per session: Not on file    Stress: Not on file   Relationships    Social connections     Talks on phone: Not on file     Gets together: Not on file Attends Mandaeism service: Not on file     Active member of club or organization: Not on file     Attends meetings of clubs or organizations: Not on file     Relationship status: Not on file    Intimate partner violence     Fear of current or ex partner: Not on file     Emotionally abused: Not on file     Physically abused: Not on file     Forced sexual activity: Not on file   Other Topics Concern     Service Not Asked    Blood Transfusions Not Asked    Caffeine Concern Not Asked    Occupational Exposure Not Asked   Marisel Proud Hazards Not Asked    Sleep Concern Not Asked    Stress Concern Not Asked    Weight Concern Not Asked    Special Diet Not Asked    Back Care Not Asked    Exercise Not Asked    Bike Helmet Not Asked    Seat Belt Not Asked    Self-Exams Not Asked   Social History Narrative    Not on file       Current Outpatient Medications   Medication Sig Dispense Refill    DULoxetine (CYMBALTA) 60 mg capsule TAKE ONE CAPSULE BY MOUTH EVERY DAY 90 Cap 0    pantoprazole (PROTONIX) 40 mg tablet Take 40 mg by mouth daily.  raNITIdine (ZANTAC) 150 mg tablet Take 150 mg by mouth.  sucralfate (CARAFATE) 1 gram tablet Take 1 g by mouth.  omeprazole (PRILOSEC) 40 mg capsule Take 40 mg by mouth daily.  nitroglycerin (NITROSTAT) 0.4 mg SL tablet 0.4 mg by SubLINGual route every five (5) minutes as needed for Chest Pain. Up to 3 doses.  sildenafil, antihypertensive, (REVATIO) 20 mg tablet Take 20 mg by mouth three (3) times daily.  MULTIVIT-MINERALS/FA/LYCOPENE (ONE-A-DAY MEN'S PO) Take 1 Tab by mouth daily.  metoprolol tartrate (LOPRESSOR) 25 mg tablet Take 1 Tab by mouth two (2) times a day. 60 Tab 1    clopidogrel (PLAVIX) 75 mg tab Take 1 Tab by mouth daily. 30 Tab 1    tamsulosin (FLOMAX) 0.4 mg capsule Take 0.4 mg by mouth daily.  cpap machine kit Take  by inhalation nightly.       butalbital-acetaminophen-caffeine (FIORICET, ESGIC) -40 mg per tablet TAKE 1 TABLET BY MOUTH EVERY 4 HOURS AS NEEDED FOR HEADACHE      gabapentin (NEURONTIN) 100 mg capsule TAKE 2 CAPSULES BY MOUTH THREE TIMES DAILY      atorvastatin (LIPITOR) 80 mg tablet Take 80 mg by mouth daily.  polyethylene glycol (MIRALAX) 17 gram packet Take 17 g by mouth daily.  aspirin delayed-release 81 mg tablet Take 81 mg by mouth daily. Allergies   Allergen Reactions    Flu Vaccine 2011 (36 Mos+)(Pf) Anaphylaxis    Iodinated Contrast Media Anaphylaxis    Atorvastatin Other (comments)    Crestor [Rosuvastatin] Unknown (comments)     Intolerant; abdominal pain    Iodine Hives     IV dye    Ivp Dye [Fd And C Blue No.1] Swelling    Toradol [Ketorolac Tromethamine] Rash     Other reaction(s): Hives  Profuse sweating    Tricor [Fenofibrate Micronized] Other (comments)     Abdominal pain    Zetia [Ezetimibe] Unknown (comments)     Hot flashes          PHYSICAL EXAMINATION  Unable to perform examination secondary to COVID-19. CONSTITUTIONAL: NAD, A&O x 3    ASSESSMENT   Diagnoses and all orders for this visit:    1. Spinal stenosis of lumbar region with neurogenic claudication    2. Postlaminectomy syndrome, lumbar    3. Other intervertebral disc displacement, lumbar region        IMPRESSION AND PLAN:  The patient consented to the tele health visit and was aware that there would be a charge. During today's telephone consultation patient had c/o low back pain radiating into the right buttocks. Multiple treatment options were discussed. Pt is interested in proceeding with blocks. I will order a L spine CT myelogram. I provided him refills of Cymbalta 60 mg daily. Pt appears to be neurologically intact. I will see the patient back following the CT myelogram or earlier if needed. Written by Bandar Weinstein, as dictated by Sherrill Schmitt MD  I examined the patient, reviewed and agree with the note.

## 2021-04-27 ENCOUNTER — IMPORTED ENCOUNTER (OUTPATIENT)
Dept: URBAN - NONMETROPOLITAN AREA CLINIC 1 | Facility: CLINIC | Age: 66
End: 2021-04-27

## 2021-04-27 ENCOUNTER — VIRTUAL VISIT (OUTPATIENT)
Dept: ORTHOPEDIC SURGERY | Age: 66
End: 2021-04-27
Payer: OTHER GOVERNMENT

## 2021-04-27 DIAGNOSIS — M96.1 POSTLAMINECTOMY SYNDROME, LUMBAR: ICD-10-CM

## 2021-04-27 DIAGNOSIS — M48.062 SPINAL STENOSIS OF LUMBAR REGION WITH NEUROGENIC CLAUDICATION: Primary | ICD-10-CM

## 2021-04-27 DIAGNOSIS — M51.26 OTHER INTERVERTEBRAL DISC DISPLACEMENT, LUMBAR REGION: ICD-10-CM

## 2021-04-27 PROBLEM — H52.03: Noted: 2021-04-27

## 2021-04-27 PROBLEM — H52.4: Noted: 2021-04-27

## 2021-04-27 PROBLEM — H25.813: Noted: 2021-04-27

## 2021-04-27 PROCEDURE — 92015 DETERMINE REFRACTIVE STATE: CPT

## 2021-04-27 PROCEDURE — 99442 PR PHYS/QHP TELEPHONE EVALUATION 11-20 MIN: CPT | Performed by: PHYSICAL MEDICINE & REHABILITATION

## 2021-04-27 PROCEDURE — 92004 COMPRE OPH EXAM NEW PT 1/>: CPT

## 2021-04-27 RX ORDER — BUTALBITAL, ACETAMINOPHEN AND CAFFEINE 50; 325; 40 MG/1; MG/1; MG/1
TABLET ORAL
COMMUNITY
Start: 2021-04-22

## 2021-04-27 RX ORDER — GABAPENTIN 100 MG/1
CAPSULE ORAL
COMMUNITY
Start: 2021-04-22

## 2021-04-27 RX ORDER — DULOXETIN HYDROCHLORIDE 60 MG/1
60 CAPSULE, DELAYED RELEASE ORAL DAILY
Qty: 180 CAP | Refills: 0 | Status: SHIPPED | OUTPATIENT
Start: 2021-04-27 | End: 2021-07-29

## 2021-04-27 NOTE — PATIENT DISCUSSION
Cataract(s)-Visually significant.-Cataract(s) causing symptomatic impairment of visual function not correctable with a tolerable change in glasses or contact lenses lighting or non-operative means resulting in specific activity limitations and/or participation restrictions including but not limited to reading viewing television driving or meeting vocational or recreational needs. -Expectation is clearer vision and reduced glare disability after cataract removal.-Refer to Dr Heraclio Sheikh for cataract evaluation  pt not ready at this time-Pt has trouble with MAC Hyperopia-Discussed diagnosis with patient. Updated spec Rx given. Recommend lens that will provide comfort as well as protect safety and health of eyes.

## 2021-05-04 DIAGNOSIS — M51.26 OTHER INTERVERTEBRAL DISC DISPLACEMENT, LUMBAR REGION: ICD-10-CM

## 2021-05-04 DIAGNOSIS — M48.062 SPINAL STENOSIS OF LUMBAR REGION WITH NEUROGENIC CLAUDICATION: ICD-10-CM

## 2021-05-04 DIAGNOSIS — M96.1 POSTLAMINECTOMY SYNDROME, LUMBAR: ICD-10-CM

## 2021-07-29 DIAGNOSIS — M51.26 OTHER INTERVERTEBRAL DISC DISPLACEMENT, LUMBAR REGION: ICD-10-CM

## 2021-07-29 DIAGNOSIS — M96.1 POSTLAMINECTOMY SYNDROME, LUMBAR: ICD-10-CM

## 2021-07-29 DIAGNOSIS — M48.062 SPINAL STENOSIS OF LUMBAR REGION WITH NEUROGENIC CLAUDICATION: ICD-10-CM

## 2021-07-29 RX ORDER — DULOXETIN HYDROCHLORIDE 60 MG/1
CAPSULE, DELAYED RELEASE ORAL
Qty: 180 CAPSULE | Refills: 0 | Status: SHIPPED | OUTPATIENT
Start: 2021-07-29

## 2021-08-19 NOTE — ROUTINE PROCESS
Dc instructions given to patient and spouse and was understood Donor Site Anesthesia Type: same as repair anesthesia

## 2022-04-09 ASSESSMENT — TONOMETRY
OS_IOP_MMHG: 17
OD_IOP_MMHG: 15

## 2022-04-09 ASSESSMENT — VISUAL ACUITY
OD_SC: 20/20
OS_SC: 20/25+

## 2025-07-14 ENCOUNTER — NEW PATIENT (OUTPATIENT)
Age: 70
End: 2025-07-14

## 2025-07-14 DIAGNOSIS — H35.3131: ICD-10-CM

## 2025-07-14 DIAGNOSIS — H52.4: ICD-10-CM

## 2025-07-14 DIAGNOSIS — H40.013: ICD-10-CM

## 2025-07-14 DIAGNOSIS — H25.813: ICD-10-CM

## 2025-07-14 DIAGNOSIS — H52.03: ICD-10-CM

## 2025-07-14 DIAGNOSIS — H52.223: ICD-10-CM

## 2025-07-14 PROCEDURE — 92015 DETERMINE REFRACTIVE STATE: CPT

## 2025-07-14 PROCEDURE — 99204 OFFICE O/P NEW MOD 45 MIN: CPT
